# Patient Record
Sex: MALE | Race: WHITE | NOT HISPANIC OR LATINO | Employment: FULL TIME | RURAL
[De-identification: names, ages, dates, MRNs, and addresses within clinical notes are randomized per-mention and may not be internally consistent; named-entity substitution may affect disease eponyms.]

---

## 2018-10-02 ENCOUNTER — HISTORICAL (OUTPATIENT)
Dept: ADMINISTRATIVE | Facility: HOSPITAL | Age: 53
End: 2018-10-02

## 2018-10-04 LAB
LAB AP CLINICAL INFORMATION: NORMAL
LAB AP DIAGNOSIS - HISTORICAL: NORMAL
LAB AP GROSS PATHOLOGY - HISTORICAL: NORMAL
LAB AP SPECIMEN SUBMITTED - HISTORICAL: NORMAL

## 2021-03-26 DIAGNOSIS — N52.9 ERECTILE DYSFUNCTION, UNSPECIFIED ERECTILE DYSFUNCTION TYPE: Primary | ICD-10-CM

## 2021-03-26 RX ORDER — SILDENAFIL CITRATE 20 MG/1
TABLET ORAL
Qty: 30 TABLET | Refills: 11 | Status: SHIPPED | OUTPATIENT
Start: 2021-03-26 | End: 2022-11-07 | Stop reason: SDUPTHER

## 2021-04-01 ENCOUNTER — RESEARCH ENCOUNTER (OUTPATIENT)
Dept: FAMILY MEDICINE | Facility: CLINIC | Age: 56
End: 2021-04-01
Payer: OTHER GOVERNMENT

## 2021-04-01 ENCOUNTER — DOCUMENTATION ONLY (OUTPATIENT)
Dept: FAMILY MEDICINE | Facility: CLINIC | Age: 56
End: 2021-04-01

## 2021-04-01 DIAGNOSIS — Z03.818 ENCNTR FOR OBS FOR SUSP EXPSR TO OTH BIOLG AGENTS RULED OUT: ICD-10-CM

## 2021-04-01 PROCEDURE — 99213 OFFICE O/P EST LOW 20 MIN: CPT | Mod: GC,,, | Performed by: FAMILY MEDICINE

## 2021-04-01 PROCEDURE — 99213 PR OFFICE/OUTPT VISIT, EST, LEVL III, 20-29 MIN: ICD-10-PCS | Mod: GC,,, | Performed by: FAMILY MEDICINE

## 2021-04-08 ENCOUNTER — PATIENT MESSAGE (OUTPATIENT)
Dept: FAMILY MEDICINE | Facility: CLINIC | Age: 56
End: 2021-04-08

## 2021-04-08 ENCOUNTER — TELEPHONE (OUTPATIENT)
Dept: FAMILY MEDICINE | Facility: CLINIC | Age: 56
End: 2021-04-08

## 2021-05-25 ENCOUNTER — OFFICE VISIT (OUTPATIENT)
Dept: FAMILY MEDICINE | Facility: CLINIC | Age: 56
End: 2021-05-25
Payer: OTHER GOVERNMENT

## 2021-05-25 VITALS
WEIGHT: 175 LBS | DIASTOLIC BLOOD PRESSURE: 96 MMHG | SYSTOLIC BLOOD PRESSURE: 143 MMHG | RESPIRATION RATE: 18 BRPM | OXYGEN SATURATION: 95 % | HEART RATE: 54 BPM | BODY MASS INDEX: 28.12 KG/M2 | HEIGHT: 66 IN | TEMPERATURE: 99 F

## 2021-05-25 DIAGNOSIS — L23.7 POISON IVY: Primary | ICD-10-CM

## 2021-05-25 DIAGNOSIS — R03.0 ELEVATED BP WITHOUT DIAGNOSIS OF HYPERTENSION: ICD-10-CM

## 2021-05-25 DIAGNOSIS — N52.9 ERECTILE DYSFUNCTION, UNSPECIFIED ERECTILE DYSFUNCTION TYPE: ICD-10-CM

## 2021-05-25 DIAGNOSIS — R53.83 FATIGUE, UNSPECIFIED TYPE: ICD-10-CM

## 2021-05-25 LAB
ANION GAP SERPL CALCULATED.3IONS-SCNC: 7 MMOL/L (ref 7–16)
BUN SERPL-MCNC: 9 MG/DL (ref 7–18)
BUN/CREAT SERPL: 9 (ref 6–20)
CALCIUM SERPL-MCNC: 8.6 MG/DL (ref 8.5–10.1)
CHLORIDE SERPL-SCNC: 107 MMOL/L (ref 98–107)
CHOLEST SERPL-MCNC: 202 MG/DL (ref 0–200)
CHOLEST/HDLC SERPL: 5.2 {RATIO}
CO2 SERPL-SCNC: 32 MMOL/L (ref 21–32)
CREAT SERPL-MCNC: 1.03 MG/DL (ref 0.7–1.3)
GLUCOSE SERPL-MCNC: 102 MG/DL (ref 74–106)
HDLC SERPL-MCNC: 39 MG/DL (ref 40–60)
LDLC SERPL CALC-MCNC: 119 MG/DL
LDLC/HDLC SERPL: 3.1 {RATIO}
NONHDLC SERPL-MCNC: 163 MG/DL
POTASSIUM SERPL-SCNC: 4.1 MMOL/L (ref 3.5–5.1)
SODIUM SERPL-SCNC: 142 MMOL/L (ref 136–145)
TRIGL SERPL-MCNC: 219 MG/DL (ref 35–150)
VLDLC SERPL-MCNC: 44 MG/DL

## 2021-05-25 PROCEDURE — 84403 ASSAY OF TOTAL TESTOSTERONE: CPT | Mod: 90,,, | Performed by: CLINICAL MEDICAL LABORATORY

## 2021-05-25 PROCEDURE — 84402 TESTOSTERONE, FREE AND TOTAL: ICD-10-PCS | Mod: 90,,, | Performed by: CLINICAL MEDICAL LABORATORY

## 2021-05-25 PROCEDURE — 96372 THER/PROPH/DIAG INJ SC/IM: CPT | Mod: ,,, | Performed by: FAMILY MEDICINE

## 2021-05-25 PROCEDURE — 84403 TESTOSTERONE, FREE AND TOTAL: ICD-10-PCS | Mod: 90,,, | Performed by: CLINICAL MEDICAL LABORATORY

## 2021-05-25 PROCEDURE — 80048 BASIC METABOLIC PANEL: ICD-10-PCS | Mod: ,,, | Performed by: CLINICAL MEDICAL LABORATORY

## 2021-05-25 PROCEDURE — 96372 PR INJECTION,THERAP/PROPH/DIAG2ST, IM OR SUBCUT: ICD-10-PCS | Mod: ,,, | Performed by: FAMILY MEDICINE

## 2021-05-25 PROCEDURE — 80048 BASIC METABOLIC PNL TOTAL CA: CPT | Mod: ,,, | Performed by: CLINICAL MEDICAL LABORATORY

## 2021-05-25 PROCEDURE — 80061 LIPID PANEL: CPT | Mod: ,,, | Performed by: CLINICAL MEDICAL LABORATORY

## 2021-05-25 PROCEDURE — 80061 LIPID PANEL: ICD-10-PCS | Mod: ,,, | Performed by: CLINICAL MEDICAL LABORATORY

## 2021-05-25 PROCEDURE — 84402 ASSAY OF FREE TESTOSTERONE: CPT | Mod: 90,,, | Performed by: CLINICAL MEDICAL LABORATORY

## 2021-05-25 PROCEDURE — 99214 OFFICE O/P EST MOD 30 MIN: CPT | Mod: 25,,, | Performed by: FAMILY MEDICINE

## 2021-05-25 PROCEDURE — 99214 PR OFFICE/OUTPT VISIT, EST, LEVL IV, 30-39 MIN: ICD-10-PCS | Mod: 25,,, | Performed by: FAMILY MEDICINE

## 2021-05-25 RX ORDER — PREDNISONE 20 MG/1
40 TABLET ORAL DAILY
Qty: 10 TABLET | Refills: 0 | Status: SHIPPED | OUTPATIENT
Start: 2021-05-25 | End: 2021-05-30

## 2021-05-25 RX ORDER — DEXAMETHASONE SODIUM PHOSPHATE 4 MG/ML
6 INJECTION, SOLUTION INTRA-ARTICULAR; INTRALESIONAL; INTRAMUSCULAR; INTRAVENOUS; SOFT TISSUE
Status: COMPLETED | OUTPATIENT
Start: 2021-05-25 | End: 2021-05-25

## 2021-05-25 RX ORDER — SILDENAFIL 50 MG/1
TABLET, FILM COATED ORAL
Qty: 30 TABLET | Refills: 1 | Status: SHIPPED | OUTPATIENT
Start: 2021-05-25 | End: 2022-11-22

## 2021-05-25 RX ADMIN — DEXAMETHASONE SODIUM PHOSPHATE 6 MG: 4 INJECTION, SOLUTION INTRA-ARTICULAR; INTRALESIONAL; INTRAMUSCULAR; INTRAVENOUS; SOFT TISSUE at 12:05

## 2021-05-27 LAB
TESTOST FREE SERPL-MCNC: 9.84 NG/DL (ref 4.06–15.6)
TESTOST SERPL-MCNC: 328 NG/DL (ref 240–950)

## 2021-06-21 ENCOUNTER — TELEPHONE (OUTPATIENT)
Dept: FAMILY MEDICINE | Facility: CLINIC | Age: 56
End: 2021-06-21

## 2021-06-22 ENCOUNTER — TELEPHONE (OUTPATIENT)
Dept: FAMILY MEDICINE | Facility: CLINIC | Age: 56
End: 2021-06-22

## 2021-09-10 ENCOUNTER — HOSPITAL ENCOUNTER (EMERGENCY)
Facility: HOSPITAL | Age: 56
Discharge: HOME | End: 2021-09-10
Payer: OTHER GOVERNMENT

## 2021-09-10 VITALS
BODY MASS INDEX: 28.12 KG/M2 | TEMPERATURE: 98 F | HEIGHT: 66 IN | RESPIRATION RATE: 22 BRPM | HEART RATE: 60 BPM | WEIGHT: 175 LBS | DIASTOLIC BLOOD PRESSURE: 85 MMHG | SYSTOLIC BLOOD PRESSURE: 137 MMHG | OXYGEN SATURATION: 97 %

## 2021-09-10 DIAGNOSIS — Z03.818 ENCNTR FOR OBS FOR SUSP EXPSR TO OTH BIOLG AGENTS RULED OUT: ICD-10-CM

## 2021-09-10 DIAGNOSIS — N20.0 RIGHT NEPHROLITHIASIS: Primary | ICD-10-CM

## 2021-09-10 DIAGNOSIS — E87.6 HYPOKALEMIA: ICD-10-CM

## 2021-09-10 LAB
ANION GAP SERPL CALCULATED.3IONS-SCNC: 13 MMOL/L (ref 7–16)
BACTERIA #/AREA URNS HPF: ABNORMAL /HPF
BILIRUB UR QL STRIP: NEGATIVE
BUN SERPL-MCNC: 10 MG/DL (ref 7–18)
BUN/CREAT SERPL: 8 (ref 6–20)
CALCIUM SERPL-MCNC: 8.6 MG/DL (ref 8.5–10.1)
CHLORIDE SERPL-SCNC: 105 MMOL/L (ref 98–107)
CLARITY UR: CLEAR
CO2 SERPL-SCNC: 25 MMOL/L (ref 21–32)
COLOR UR: ABNORMAL
CREAT SERPL-MCNC: 1.33 MG/DL (ref 0.7–1.3)
GLUCOSE SERPL-MCNC: 139 MG/DL (ref 74–106)
GLUCOSE UR STRIP-MCNC: NEGATIVE MG/DL
KETONES UR STRIP-SCNC: NEGATIVE MG/DL
LEUKOCYTE ESTERASE UR QL STRIP: NEGATIVE
MUCOUS THREADS #/AREA URNS HPF: ABNORMAL /HPF
NITRITE UR QL STRIP: NEGATIVE
PH UR STRIP: 6 PH UNITS
POTASSIUM SERPL-SCNC: 3.3 MMOL/L (ref 3.5–5.1)
PROT UR QL STRIP: NEGATIVE
RBC # UR STRIP: ABNORMAL /UL
RBC #/AREA URNS HPF: ABNORMAL /HPF
SODIUM SERPL-SCNC: 140 MMOL/L (ref 136–145)
SP GR UR STRIP: >=1.03
SQUAMOUS #/AREA URNS LPF: ABNORMAL /LPF
UROBILINOGEN UR STRIP-ACNC: 0.2 MG/DL
WBC #/AREA URNS HPF: ABNORMAL /HPF
YEAST #/AREA URNS HPF: ABNORMAL /HPF

## 2021-09-10 PROCEDURE — 81001 URINALYSIS AUTO W/SCOPE: CPT | Performed by: NURSE PRACTITIONER

## 2021-09-10 PROCEDURE — 96361 HYDRATE IV INFUSION ADD-ON: CPT

## 2021-09-10 PROCEDURE — 80048 BASIC METABOLIC PNL TOTAL CA: CPT | Performed by: NURSE PRACTITIONER

## 2021-09-10 PROCEDURE — 99284 EMERGENCY DEPT VISIT MOD MDM: CPT | Mod: 25

## 2021-09-10 PROCEDURE — 36415 COLL VENOUS BLD VENIPUNCTURE: CPT | Performed by: NURSE PRACTITIONER

## 2021-09-10 PROCEDURE — 96374 THER/PROPH/DIAG INJ IV PUSH: CPT

## 2021-09-10 PROCEDURE — 63600175 PHARM REV CODE 636 W HCPCS: Performed by: NURSE PRACTITIONER

## 2021-09-10 PROCEDURE — 81003 URINALYSIS AUTO W/O SCOPE: CPT | Performed by: NURSE PRACTITIONER

## 2021-09-10 PROCEDURE — 99284 PR EMERGENCY DEPT VISIT,LEVEL IV: ICD-10-PCS | Mod: ,,, | Performed by: NURSE PRACTITIONER

## 2021-09-10 PROCEDURE — 99284 EMERGENCY DEPT VISIT MOD MDM: CPT | Mod: ,,, | Performed by: NURSE PRACTITIONER

## 2021-09-10 PROCEDURE — 96375 TX/PRO/DX INJ NEW DRUG ADDON: CPT

## 2021-09-10 PROCEDURE — 25000003 PHARM REV CODE 250: Performed by: NURSE PRACTITIONER

## 2021-09-10 RX ORDER — KETOROLAC TROMETHAMINE 30 MG/ML
30 INJECTION, SOLUTION INTRAMUSCULAR; INTRAVENOUS
Status: COMPLETED | OUTPATIENT
Start: 2021-09-10 | End: 2021-09-10

## 2021-09-10 RX ORDER — SODIUM CHLORIDE 9 MG/ML
INJECTION, SOLUTION INTRAVENOUS
Status: COMPLETED | OUTPATIENT
Start: 2021-09-10 | End: 2021-09-10

## 2021-09-10 RX ORDER — KETOROLAC TROMETHAMINE 10 MG/1
10 TABLET, FILM COATED ORAL EVERY 6 HOURS PRN
Qty: 12 TABLET | Refills: 0 | Status: SHIPPED | OUTPATIENT
Start: 2021-09-10 | End: 2021-09-13

## 2021-09-10 RX ORDER — ONDANSETRON 2 MG/ML
4 INJECTION INTRAMUSCULAR; INTRAVENOUS
Status: COMPLETED | OUTPATIENT
Start: 2021-09-10 | End: 2021-09-10

## 2021-09-10 RX ORDER — ONDANSETRON 4 MG/1
4 TABLET, FILM COATED ORAL EVERY 6 HOURS PRN
Qty: 12 TABLET | Refills: 0 | Status: SHIPPED | OUTPATIENT
Start: 2021-09-10 | End: 2022-11-22

## 2021-09-10 RX ORDER — POTASSIUM CHLORIDE 20 MEQ/1
40 TABLET, EXTENDED RELEASE ORAL
Status: COMPLETED | OUTPATIENT
Start: 2021-09-10 | End: 2021-09-10

## 2021-09-10 RX ADMIN — KETOROLAC TROMETHAMINE 30 MG: 30 INJECTION, SOLUTION INTRAMUSCULAR; INTRAVENOUS at 12:09

## 2021-09-10 RX ADMIN — SODIUM CHLORIDE: 9 INJECTION, SOLUTION INTRAVENOUS at 12:09

## 2021-09-10 RX ADMIN — ONDANSETRON HYDROCHLORIDE 4 MG: 2 SOLUTION INTRAMUSCULAR; INTRAVENOUS at 12:09

## 2021-09-10 RX ADMIN — POTASSIUM CHLORIDE 40 MEQ: 1500 TABLET, EXTENDED RELEASE ORAL at 12:09

## 2021-10-07 ENCOUNTER — OFFICE VISIT (OUTPATIENT)
Dept: UROLOGY | Facility: CLINIC | Age: 56
End: 2021-10-07
Payer: OTHER GOVERNMENT

## 2021-10-07 VITALS
SYSTOLIC BLOOD PRESSURE: 136 MMHG | HEART RATE: 64 BPM | BODY MASS INDEX: 28.12 KG/M2 | HEIGHT: 66 IN | WEIGHT: 175 LBS | DIASTOLIC BLOOD PRESSURE: 87 MMHG

## 2021-10-07 DIAGNOSIS — N20.0 KIDNEY STONE: Primary | ICD-10-CM

## 2021-10-07 DIAGNOSIS — Z12.5 SCREENING PSA (PROSTATE SPECIFIC ANTIGEN): ICD-10-CM

## 2021-10-07 DIAGNOSIS — N52.9 ERECTILE DYSFUNCTION, UNSPECIFIED ERECTILE DYSFUNCTION TYPE: ICD-10-CM

## 2021-10-07 PROCEDURE — 99202 PR OFFICE/OUTPT VISIT, NEW, LEVL II, 15-29 MIN: ICD-10-PCS | Mod: S$PBB,,, | Performed by: UROLOGY

## 2021-10-07 PROCEDURE — 99202 OFFICE O/P NEW SF 15 MIN: CPT | Mod: S$PBB,,, | Performed by: UROLOGY

## 2021-10-07 PROCEDURE — 99213 OFFICE O/P EST LOW 20 MIN: CPT | Mod: PBBFAC | Performed by: UROLOGY

## 2021-10-07 RX ORDER — KETOROLAC TROMETHAMINE 10 MG/1
10 TABLET, FILM COATED ORAL EVERY 6 HOURS PRN
Qty: 30 TABLET | Refills: 1 | Status: SHIPPED | OUTPATIENT
Start: 2021-10-07 | End: 2021-10-12

## 2021-10-31 ENCOUNTER — HOSPITAL ENCOUNTER (EMERGENCY)
Facility: HOSPITAL | Age: 56
Discharge: HOME | End: 2021-10-31
Payer: OTHER GOVERNMENT

## 2021-10-31 VITALS
RESPIRATION RATE: 16 BRPM | BODY MASS INDEX: 28.93 KG/M2 | WEIGHT: 180 LBS | HEART RATE: 93 BPM | TEMPERATURE: 99 F | HEIGHT: 66 IN | DIASTOLIC BLOOD PRESSURE: 94 MMHG | OXYGEN SATURATION: 98 % | SYSTOLIC BLOOD PRESSURE: 162 MMHG

## 2021-10-31 DIAGNOSIS — Z03.818 ENCNTR FOR OBS FOR SUSP EXPSR TO OTH BIOLG AGENTS RULED OUT: ICD-10-CM

## 2021-10-31 DIAGNOSIS — J02.9 PHARYNGITIS, UNSPECIFIED ETIOLOGY: Primary | ICD-10-CM

## 2021-10-31 LAB — RAPID GROUP A STREP: NEGATIVE

## 2021-10-31 PROCEDURE — 99283 EMERGENCY DEPT VISIT LOW MDM: CPT | Mod: ,,, | Performed by: NURSE PRACTITIONER

## 2021-10-31 PROCEDURE — 99283 EMERGENCY DEPT VISIT LOW MDM: CPT

## 2021-10-31 PROCEDURE — 99283 PR EMERGENCY DEPT VISIT,LEVEL III: ICD-10-PCS | Mod: ,,, | Performed by: NURSE PRACTITIONER

## 2021-10-31 PROCEDURE — 87880 STREP A ASSAY W/OPTIC: CPT | Performed by: NURSE PRACTITIONER

## 2021-10-31 RX ORDER — AMOXICILLIN AND CLAVULANATE POTASSIUM 875; 125 MG/1; MG/1
1 TABLET, FILM COATED ORAL 2 TIMES DAILY
Qty: 14 TABLET | Refills: 0 | Status: SHIPPED | OUTPATIENT
Start: 2021-10-31 | End: 2022-11-22

## 2021-10-31 RX ORDER — KETOROLAC TROMETHAMINE 10 MG/1
10 TABLET, FILM COATED ORAL
COMMUNITY
Start: 2021-09-10 | End: 2022-11-07

## 2021-11-04 ENCOUNTER — OFFICE VISIT (OUTPATIENT)
Dept: FAMILY MEDICINE | Facility: CLINIC | Age: 56
End: 2021-11-04
Payer: OTHER GOVERNMENT

## 2021-11-04 VITALS
DIASTOLIC BLOOD PRESSURE: 98 MMHG | WEIGHT: 166 LBS | BODY MASS INDEX: 26.68 KG/M2 | HEART RATE: 99 BPM | SYSTOLIC BLOOD PRESSURE: 136 MMHG | TEMPERATURE: 99 F | HEIGHT: 66 IN | OXYGEN SATURATION: 99 %

## 2021-11-04 DIAGNOSIS — R07.0 THROAT PAIN: ICD-10-CM

## 2021-11-04 DIAGNOSIS — K13.70 UNSPECIFIED LESIONS OF ORAL MUCOSA: Primary | ICD-10-CM

## 2021-11-04 PROCEDURE — 87070 CULTURE OTHR SPECIMN AEROBIC: CPT | Mod: ,,, | Performed by: CLINICAL MEDICAL LABORATORY

## 2021-11-04 PROCEDURE — 99203 OFFICE O/P NEW LOW 30 MIN: CPT | Mod: ,,, | Performed by: NURSE PRACTITIONER

## 2021-11-04 PROCEDURE — 87529 MAYO GENERIC ORDERABLE: ICD-10-PCS | Mod: 90,,, | Performed by: CLINICAL MEDICAL LABORATORY

## 2021-11-04 PROCEDURE — 99203 PR OFFICE/OUTPT VISIT, NEW, LEVL III, 30-44 MIN: ICD-10-PCS | Mod: ,,, | Performed by: NURSE PRACTITIONER

## 2021-11-04 PROCEDURE — 87070 CULTURE, UPPER RESPIRATORY: ICD-10-PCS | Mod: ,,, | Performed by: CLINICAL MEDICAL LABORATORY

## 2021-11-04 PROCEDURE — 36415 COLL VENOUS BLD VENIPUNCTURE: CPT | Mod: ,,, | Performed by: CLINICAL MEDICAL LABORATORY

## 2021-11-04 PROCEDURE — 36415 PR COLLECTION VENOUS BLOOD,VENIPUNCTURE: ICD-10-PCS | Mod: ,,, | Performed by: CLINICAL MEDICAL LABORATORY

## 2021-11-04 PROCEDURE — 87529 HSV DNA AMP PROBE: CPT | Mod: 90,,, | Performed by: CLINICAL MEDICAL LABORATORY

## 2021-11-04 RX ORDER — NYSTATIN 100000 [USP'U]/ML
4 SUSPENSION ORAL 4 TIMES DAILY
Qty: 160 ML | Refills: 0 | Status: SHIPPED | OUTPATIENT
Start: 2021-11-04 | End: 2021-11-14

## 2021-11-04 RX ORDER — VALACYCLOVIR HYDROCHLORIDE 500 MG/1
500 TABLET, FILM COATED ORAL 2 TIMES DAILY
Qty: 28 TABLET | Refills: 0 | Status: SHIPPED | OUTPATIENT
Start: 2021-11-04 | End: 2021-11-18 | Stop reason: SDUPTHER

## 2021-11-06 LAB — CULTURE, UPPER RESPIRATORY: NORMAL

## 2021-11-12 LAB — MAYO GENERIC ORDERABLE RESULT: ABNORMAL

## 2021-11-18 DIAGNOSIS — K13.70 UNSPECIFIED LESIONS OF ORAL MUCOSA: ICD-10-CM

## 2021-11-18 RX ORDER — VALACYCLOVIR HYDROCHLORIDE 500 MG/1
500 TABLET, FILM COATED ORAL 2 TIMES DAILY
Qty: 28 TABLET | Refills: 0 | Status: SHIPPED | OUTPATIENT
Start: 2021-11-18 | End: 2023-07-05

## 2022-05-20 ENCOUNTER — OFFICE VISIT (OUTPATIENT)
Dept: FAMILY MEDICINE | Facility: CLINIC | Age: 57
End: 2022-05-20
Payer: COMMERCIAL

## 2022-05-20 VITALS
TEMPERATURE: 98 F | DIASTOLIC BLOOD PRESSURE: 98 MMHG | BODY MASS INDEX: 27.77 KG/M2 | WEIGHT: 172.81 LBS | RESPIRATION RATE: 16 BRPM | SYSTOLIC BLOOD PRESSURE: 132 MMHG | HEART RATE: 63 BPM | OXYGEN SATURATION: 96 % | HEIGHT: 66 IN

## 2022-05-20 DIAGNOSIS — L23.7 CONTACT DERMATITIS DUE TO POISON IVY: Primary | ICD-10-CM

## 2022-05-20 PROCEDURE — 99213 PR OFFICE/OUTPT VISIT, EST, LEVL III, 20-29 MIN: ICD-10-PCS | Mod: 25,,, | Performed by: NURSE PRACTITIONER

## 2022-05-20 PROCEDURE — 99213 OFFICE O/P EST LOW 20 MIN: CPT | Mod: 25,,, | Performed by: NURSE PRACTITIONER

## 2022-05-20 PROCEDURE — 96372 THER/PROPH/DIAG INJ SC/IM: CPT | Mod: ,,, | Performed by: NURSE PRACTITIONER

## 2022-05-20 PROCEDURE — 96372 PR INJECTION,THERAP/PROPH/DIAG2ST, IM OR SUBCUT: ICD-10-PCS | Mod: ,,, | Performed by: NURSE PRACTITIONER

## 2022-05-20 RX ORDER — TRIAMCINOLONE ACETONIDE 1 MG/G
CREAM TOPICAL 2 TIMES DAILY PRN
Qty: 454 G | Refills: 0 | Status: SHIPPED | OUTPATIENT
Start: 2022-05-20 | End: 2023-07-05

## 2022-05-20 RX ORDER — DEXAMETHASONE SODIUM PHOSPHATE 4 MG/ML
4 INJECTION, SOLUTION INTRA-ARTICULAR; INTRALESIONAL; INTRAMUSCULAR; INTRAVENOUS; SOFT TISSUE
Status: COMPLETED | OUTPATIENT
Start: 2022-05-20 | End: 2022-05-20

## 2022-05-20 RX ORDER — PREDNISONE 20 MG/1
TABLET ORAL
Qty: 10 TABLET | Refills: 0 | Status: SHIPPED | OUTPATIENT
Start: 2022-05-20 | End: 2022-09-20

## 2022-05-20 RX ADMIN — DEXAMETHASONE SODIUM PHOSPHATE 4 MG: 4 INJECTION, SOLUTION INTRA-ARTICULAR; INTRALESIONAL; INTRAMUSCULAR; INTRAVENOUS; SOFT TISSUE at 02:05

## 2022-05-20 NOTE — PROGRESS NOTES
"Subjective:       Patient ID: Eugenio Judge is a 56 y.o. male.    Chief Complaint: Poison Ivy (Patient present to clinic with contact to poison ivy. Rash on bilateral arms and legs starting Saturday. )    HPI  Review of Systems   Respiratory: Negative.    Cardiovascular: Negative.    Skin: Positive for itching and rash.          Reviewed family, medical, surgical, and social history.    Objective:      BP (!) 132/98 (BP Location: Right arm, Patient Position: Sitting, BP Method: Large (Automatic))   Pulse 63   Temp 98.4 °F (36.9 °C)   Resp 16   Ht 5' 6" (1.676 m)   Wt 78.4 kg (172 lb 12.8 oz)   SpO2 96%   BMI 27.89 kg/m²   Physical Exam  Vitals and nursing note reviewed.   Constitutional:       General: He is not in acute distress.     Appearance: Normal appearance. He is not ill-appearing, toxic-appearing or diaphoretic.   Cardiovascular:      Rate and Rhythm: Normal rate and regular rhythm.      Heart sounds: Normal heart sounds.   Pulmonary:      Effort: Pulmonary effort is normal.      Breath sounds: Normal breath sounds.   Musculoskeletal:      Cervical back: Normal range of motion and neck supple.   Skin:     General: Skin is warm and dry.      Findings: Rash present.      Comments: Vesicular lesions in linear distribution on arms, legs, trunk. No scabbing or exudate.    Neurological:      Mental Status: He is alert and oriented to person, place, and time.   Psychiatric:         Mood and Affect: Mood normal.         Behavior: Behavior normal.         Thought Content: Thought content normal.         Judgment: Judgment normal.            No visits with results within 1 Day(s) from this visit.   Latest known visit with results is:   Office Visit on 11/04/2021   Component Date Value Ref Range Status    Culture, Upper Respiratory 11/04/2021 Typical respiratory rae   Final    [place] Generic Orderable 11/04/2021 SEE COMMENTS (A)  Final       Test                          Result              Flag  Unit  " RefValue  ----------------------------------------------------------------------  Herpes Simplex Virus Type 1, 2 DNA    Source                      mouth                                       HSV 1 DNA                   DETECTED             AB                     HSV 2 DNA                   NOT DETECTED                              REFERENCE RANGE: NOT DETECTED     This test was developed and its analytical  performance characteristics have been determined  by [facility]. It has not been cleared or  approved by FDA. This assay has been validated  pursuant to the CLIA regulations and is used for  clinical purposes.     Test Performed by:  [facility]  [id number] [place] [address]      Assessment:       1. Contact dermatitis due to poison ivy        Plan:       Contact dermatitis due to poison ivy  -     predniSONE (DELTASONE) 20 MG tablet; Take 2 po daily for 5 days. Then, 1 po daily for 5 days. Start tomorrow.  Dispense: 10 tablet; Refill: 0  -     triamcinolone acetonide 0.1% (KENALOG) 0.1 % cream; Apply topically 2 (two) times daily as needed (itching. Do not use on face.).  Dispense: 454 g; Refill: 0  -     dexamethasone injection 4 mg    RTC PRN          Risks, benefits, and side effects were discussed with the patient. All questions were answered to the fullest satisfaction of the patient, and pt verbalized understanding and agreement to treatment plan. Pt was to call with any new or worsening symptoms, or present to the ER.

## 2022-09-20 ENCOUNTER — OFFICE VISIT (OUTPATIENT)
Dept: FAMILY MEDICINE | Facility: CLINIC | Age: 57
End: 2022-09-20
Payer: COMMERCIAL

## 2022-09-20 VITALS
SYSTOLIC BLOOD PRESSURE: 152 MMHG | WEIGHT: 179 LBS | TEMPERATURE: 98 F | HEART RATE: 57 BPM | BODY MASS INDEX: 28.77 KG/M2 | OXYGEN SATURATION: 98 % | RESPIRATION RATE: 18 BRPM | HEIGHT: 66 IN | DIASTOLIC BLOOD PRESSURE: 94 MMHG

## 2022-09-20 DIAGNOSIS — L23.7 CONTACT DERMATITIS DUE TO POISON IVY: Primary | ICD-10-CM

## 2022-09-20 PROCEDURE — 99213 PR OFFICE/OUTPT VISIT, EST, LEVL III, 20-29 MIN: ICD-10-PCS | Mod: 25,,, | Performed by: FAMILY MEDICINE

## 2022-09-20 PROCEDURE — 99213 OFFICE O/P EST LOW 20 MIN: CPT | Mod: 25,,, | Performed by: FAMILY MEDICINE

## 2022-09-20 PROCEDURE — 96372 THER/PROPH/DIAG INJ SC/IM: CPT | Mod: ,,, | Performed by: FAMILY MEDICINE

## 2022-09-20 PROCEDURE — 96372 PR INJECTION,THERAP/PROPH/DIAG2ST, IM OR SUBCUT: ICD-10-PCS | Mod: ,,, | Performed by: FAMILY MEDICINE

## 2022-09-20 RX ORDER — DEXAMETHASONE SODIUM PHOSPHATE 4 MG/ML
6 INJECTION, SOLUTION INTRA-ARTICULAR; INTRALESIONAL; INTRAMUSCULAR; INTRAVENOUS; SOFT TISSUE
Status: COMPLETED | OUTPATIENT
Start: 2022-09-20 | End: 2022-09-20

## 2022-09-20 RX ORDER — PREDNISONE 20 MG/1
TABLET ORAL
Qty: 15 TABLET | Refills: 0 | Status: SHIPPED | OUTPATIENT
Start: 2022-09-20 | End: 2022-11-22

## 2022-09-20 RX ADMIN — DEXAMETHASONE SODIUM PHOSPHATE 6 MG: 4 INJECTION, SOLUTION INTRA-ARTICULAR; INTRALESIONAL; INTRAMUSCULAR; INTRAVENOUS; SOFT TISSUE at 01:09

## 2022-09-20 NOTE — PROGRESS NOTES
Subjective:       Patient ID: Eugenio Judge is a 56 y.o. male.    Chief Complaint: Poison Ivy    Rapidly spreading lesion since yesterday.    Poison Ivy    Review of Systems      Objective:      Physical Exam  Constitutional:       General: He is not in acute distress.     Appearance: Normal appearance. He is not ill-appearing.   Cardiovascular:      Rate and Rhythm: Normal rate and regular rhythm.   Skin:     Findings: Lesion (scattered lesions on both arms and legs.  Most severe on his legs.  Several vesicular lesion) present.   Neurological:      Mental Status: He is alert.       Assessment:       Problem List Items Addressed This Visit    None  Visit Diagnoses       Contact dermatitis due to poison ivy    -  Primary            Plan:       Decadron.  Prednisone

## 2022-11-02 ENCOUNTER — HOSPITAL ENCOUNTER (OUTPATIENT)
Dept: RADIOLOGY | Facility: HOSPITAL | Age: 57
Discharge: HOME | End: 2022-11-02
Attending: UROLOGY
Payer: COMMERCIAL

## 2022-11-02 DIAGNOSIS — N20.0 KIDNEY STONE: ICD-10-CM

## 2022-11-02 PROCEDURE — 74018 XR KUB: ICD-10-PCS | Mod: 26,,, | Performed by: RADIOLOGY

## 2022-11-02 PROCEDURE — 74018 RADEX ABDOMEN 1 VIEW: CPT | Mod: TC

## 2022-11-02 PROCEDURE — 74018 RADEX ABDOMEN 1 VIEW: CPT | Mod: 26,,, | Performed by: RADIOLOGY

## 2022-11-07 ENCOUNTER — OFFICE VISIT (OUTPATIENT)
Dept: UROLOGY | Facility: CLINIC | Age: 57
End: 2022-11-07
Payer: COMMERCIAL

## 2022-11-07 VITALS
DIASTOLIC BLOOD PRESSURE: 95 MMHG | HEART RATE: 69 BPM | SYSTOLIC BLOOD PRESSURE: 144 MMHG | HEIGHT: 66 IN | WEIGHT: 186 LBS | BODY MASS INDEX: 29.89 KG/M2

## 2022-11-07 DIAGNOSIS — Z12.5 SCREENING PSA (PROSTATE SPECIFIC ANTIGEN): ICD-10-CM

## 2022-11-07 DIAGNOSIS — N20.0 KIDNEY STONES: Primary | ICD-10-CM

## 2022-11-07 DIAGNOSIS — N52.9 ERECTILE DYSFUNCTION, UNSPECIFIED ERECTILE DYSFUNCTION TYPE: ICD-10-CM

## 2022-11-07 PROCEDURE — 99213 PR OFFICE/OUTPT VISIT, EST, LEVL III, 20-29 MIN: ICD-10-PCS | Mod: S$PBB,,, | Performed by: UROLOGY

## 2022-11-07 PROCEDURE — 99214 OFFICE O/P EST MOD 30 MIN: CPT | Mod: PBBFAC | Performed by: UROLOGY

## 2022-11-07 PROCEDURE — 99213 OFFICE O/P EST LOW 20 MIN: CPT | Mod: S$PBB,,, | Performed by: UROLOGY

## 2022-11-07 RX ORDER — SILDENAFIL CITRATE 20 MG/1
TABLET ORAL
Qty: 30 TABLET | Refills: 11 | Status: SHIPPED | OUTPATIENT
Start: 2022-11-07 | End: 2023-07-05

## 2022-11-07 RX ORDER — KETOROLAC TROMETHAMINE 10 MG/1
10 TABLET, FILM COATED ORAL EVERY 4 HOURS PRN
Qty: 30 TABLET | Refills: 0 | Status: SHIPPED | OUTPATIENT
Start: 2022-11-07 | End: 2022-11-12

## 2022-11-07 NOTE — PROGRESS NOTES
Subjective:       Patient ID: Eugenio Judge is a 57 y.o. male.    Chief Complaint: Follow-up (One year visit, KUB)       History of Present Illness  Mr. Judge is 47 years old. He was in his usual state of health until Saturday, September 29 when he had onset of left mid abdominal pain that was rather severe. It is associated with nausea but no vomiting. Pain was so severe he had to go to emergency room. He had a couple episodes of pain in the same distribution of less intensity in recent months but nothing like he had on the day requiring trip to the emergency room. He was found to have a stone in the mid ureter. In now for followup of stone. He has no history of previous stone problems. He has no voiding difficulty and has not noted hematuria. He is not aware of any previous urinary tract infection or any other  problems. No history of prostate problems or other  difficulty. He was given Lortabs in the emergency room. His last pain medication was taken about 6 AM today.  Patient's father is my patient also.     The above-note is the note of October 3, 2012. Mr. Judge has had very minimal pain since he was here that day. He has taken very little Dilaudid that was given but it did work better than he did taking 10 Lortabs daily. Totally asymptomatic today.     Last note above his of November 9, 2012. Mr. Judge has had pain about 2 weeks ago that was typical of ureteral colic but short-lived and took only 2 Dilaudids. Basically back to baseline now.     Last note above is note of January 10, 2013. Patient has had essentially no pain during that time and has not had any activity of  the stone except maybe some slight twinges. He has required no pain medication. He has had the stone in the left ureter since September 29, 2012. CT scan was done September 29. Feeling fine today.  (June 18, 2013)     Mr. Judge returned today for his CT scan and followup on the stone in the left ureter which has been present for  nearly a year. Still clinically asymptomatic with no pain in many months.  (July 22, 2013  -----------------------------------------------------------------------------------------------------------------------------------------------------------------------------------------------------------------------------------------------------------------------------------------------------------------------  Above notes are from the old electronic medical record.     Mr. Judge went several years with no problems from stones but had onset of severe right ureteral colic on September 10th.  Necessitated a trip to the emergency room on that day.  He was given script for Toradol and was found to have a small calcification in line with the distal right ureter.  He also has multiple calculi overlying both kidneys that are relatively small.  He has had no pain since a few days after that trip to the emergency room and has not been aware of stone passing.    He also had questions about possible testicular hypogonadism but he had a serum testosterone of 328 which is normal and his free testosterone was in normal range also.  Needs renewal of sildenafil.  Feeling okay at present except for fatigue. (October 7, 2021)    Mr. Judge is in for yearly checkup.  He passed a stone in late May and another 1 on August 5th with minimal discomfort  and did not require pain medication.  He has had no worsening bladder outlet obstructive symptoms and otherwise has had a reasonably good year except for problems of complication from COVID vaccine last October 31st.  Blood pressure elevated today but apparently does okay most of the time. [November 7, 2022]     Review of Systems      Objective:      Physical Exam  Constitutional:       Appearance: Normal appearance. He is normal weight.   Genitourinary:     Comments: Patient requested we skip rectal exam and PSA this year  Neurological:      Mental Status: He is alert.   Psychiatric:         Mood  and Affect: Mood normal.         Behavior: Behavior normal.     Urinalysis revealed occasional pus cells but did not see any red cells.  Dipstick negative with pH 6.0 and specific gravity 1.020  Assessment:       Problem List Items Addressed This Visit    None  Visit Diagnoses       Kidney stones    -  Primary    Relevant Orders    X-Ray KUB    Erectile dysfunction, unspecified erectile dysfunction type        Relevant Medications    sildenafil (REVATIO) 20 mg Tab    Screening PSA (prostate specific antigen)        Relevant Orders    PSA, Screening            Plan:         KUB reviewed with patient.  There seem to be stones overlying both kidneys but may be fewer than there were last year.  No substantive stones noted.  Skip PSA and rectal exam at patient request.  1 year appointment with KUB and PSA.  We will see sooner as needed for acute colic etc.

## 2022-11-08 NOTE — PATIENT INSTRUCTIONS
KUB reviewed with patient.  There seem to be stones overlying both kidneys but may be fewer than there were last year.  No substantive stones noted.  Skip PSA and rectal exam at patient request.  1 year appointment with KUB and PSA.  We will see sooner as needed for acute colic etc.

## 2022-11-22 ENCOUNTER — OFFICE VISIT (OUTPATIENT)
Dept: PRIMARY CARE CLINIC | Facility: CLINIC | Age: 57
End: 2022-11-22
Payer: COMMERCIAL

## 2022-11-22 VITALS
RESPIRATION RATE: 18 BRPM | DIASTOLIC BLOOD PRESSURE: 80 MMHG | WEIGHT: 180 LBS | HEIGHT: 66 IN | HEART RATE: 70 BPM | BODY MASS INDEX: 28.93 KG/M2 | TEMPERATURE: 98 F | OXYGEN SATURATION: 97 % | SYSTOLIC BLOOD PRESSURE: 138 MMHG

## 2022-11-22 DIAGNOSIS — Z23 NEED FOR DIPHTHERIA-TETANUS-PERTUSSIS (TDAP) VACCINE: Primary | ICD-10-CM

## 2022-11-22 DIAGNOSIS — S61.210A LACERATION OF RIGHT INDEX FINGER WITHOUT FOREIGN BODY WITHOUT DAMAGE TO NAIL, INITIAL ENCOUNTER: ICD-10-CM

## 2022-11-22 PROCEDURE — 90471 IMMUNIZATION ADMIN: CPT | Mod: ,,, | Performed by: NURSE PRACTITIONER

## 2022-11-22 PROCEDURE — 90715 TDAP VACCINE GREATER THAN OR EQUAL TO 7YO IM: ICD-10-PCS | Mod: ,,, | Performed by: NURSE PRACTITIONER

## 2022-11-22 PROCEDURE — 99213 PR OFFICE/OUTPT VISIT, EST, LEVL III, 20-29 MIN: ICD-10-PCS | Mod: 25,,, | Performed by: NURSE PRACTITIONER

## 2022-11-22 PROCEDURE — 90471 TDAP VACCINE GREATER THAN OR EQUAL TO 7YO IM: ICD-10-PCS | Mod: ,,, | Performed by: NURSE PRACTITIONER

## 2022-11-22 PROCEDURE — 90715 TDAP VACCINE 7 YRS/> IM: CPT | Mod: ,,, | Performed by: NURSE PRACTITIONER

## 2022-11-22 PROCEDURE — 99213 OFFICE O/P EST LOW 20 MIN: CPT | Mod: 25,,, | Performed by: NURSE PRACTITIONER

## 2022-11-22 NOTE — PROGRESS NOTES
Subjective:       Patient ID: Eugenio Judge is a 57 y.o. male.    Chief Complaint: Work Related Injury (Patient presents here today with work related injury to right index finger that occurred on 11/22/22. Patient states he bumped his right knuckle up against a power richy. 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     Review of Systems   Integumentary:  Positive for wound.   All other systems reviewed and are negative.      Objective:      Physical Exam  Vitals and nursing note reviewed.   Constitutional:       Appearance: Normal appearance.   HENT:      Head: Normocephalic.   Eyes:      Pupils: Pupils are equal, round, and reactive to light.   Cardiovascular:      Rate and Rhythm: Normal rate and regular rhythm.      Heart sounds: Normal heart sounds.   Pulmonary:      Effort: Pulmonary effort is normal.      Breath sounds: Normal breath sounds.   Musculoskeletal:         General: Tenderness and signs of injury present. No swelling. Normal range of motion.      Cervical back: Normal range of motion.   Skin:     General: Skin is warm and dry.      Comments: Small superficial laceration right index finger MIP joint   Neurological:      General: No focal deficit present.      Mental Status: He is alert and oriented to person, place, and time.   Psychiatric:         Attention and Perception: Attention and perception normal.         Mood and Affect: Mood normal.         Speech: Speech normal.         Behavior: Behavior normal. Behavior is cooperative.         Cognition and Memory: Cognition normal.         Judgment: Judgment normal.       Assessment:       Problem List Items Addressed This Visit    None  Visit Diagnoses       Need for diphtheria-tetanus-pertussis (Tdap) vaccine    -  Primary    Relevant Orders    Tdap Vaccine            Plan:       RTW full duty. Keep wound covered while at work. RTN to wfw prn.

## 2023-01-09 ENCOUNTER — OFFICE VISIT (OUTPATIENT)
Dept: UROLOGY | Facility: CLINIC | Age: 58
End: 2023-01-09
Payer: COMMERCIAL

## 2023-01-09 VITALS
BODY MASS INDEX: 29.09 KG/M2 | SYSTOLIC BLOOD PRESSURE: 147 MMHG | HEART RATE: 73 BPM | HEIGHT: 66 IN | WEIGHT: 181 LBS | DIASTOLIC BLOOD PRESSURE: 98 MMHG

## 2023-01-09 DIAGNOSIS — I10 ESSENTIAL HYPERTENSION: ICD-10-CM

## 2023-01-09 DIAGNOSIS — D29.30 ADENOMATOID TUMOR OF EPIDIDYMIS: ICD-10-CM

## 2023-01-09 DIAGNOSIS — N50.89 SCROTAL MASS: Primary | ICD-10-CM

## 2023-01-09 PROCEDURE — 99213 PR OFFICE/OUTPT VISIT, EST, LEVL III, 20-29 MIN: ICD-10-PCS | Mod: S$PBB,,, | Performed by: UROLOGY

## 2023-01-09 PROCEDURE — 99213 OFFICE O/P EST LOW 20 MIN: CPT | Mod: PBBFAC | Performed by: UROLOGY

## 2023-01-09 PROCEDURE — 99213 OFFICE O/P EST LOW 20 MIN: CPT | Mod: S$PBB,,, | Performed by: UROLOGY

## 2023-01-09 NOTE — PATIENT INSTRUCTIONS
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  Patient will be set up for scrotal ultrasound and follow-up as quickly as we can get him worked in.  He will be notified when to return by telephone.

## 2023-01-09 NOTE — PROGRESS NOTES
Subjective:       Patient ID: Eugenio Judge is a 57 y.o. male.    Chief Complaint: Follow-up (Patient here today for lump in testicle. )       History of Present Illness  Mr. Judge is 47 years old. He was in his usual state of health until Saturday, September 29 when he had onset of left mid abdominal pain that was rather severe. It is associated with nausea but no vomiting. Pain was so severe he had to go to emergency room. He had a couple episodes of pain in the same distribution of less intensity in recent months but nothing like he had on the day requiring trip to the emergency room. He was found to have a stone in the mid ureter. In now for followup of stone. He has no history of previous stone problems. He has no voiding difficulty and has not noted hematuria. He is not aware of any previous urinary tract infection or any other  problems. No history of prostate problems or other  difficulty. He was given Lortabs in the emergency room. His last pain medication was taken about 6 AM today.  Patient's father is my patient also.     The above-note is the note of October 3, 2012. Mr. Judge has had very minimal pain since he was here that day. He has taken very little Dilaudid that was given but it did work better than he did taking 10 Lortabs daily. Totally asymptomatic today.     Last note above his of November 9, 2012. Mr. Judge has had pain about 2 weeks ago that was typical of ureteral colic but short-lived and took only 2 Dilaudids. Basically back to baseline now.     Last note above is note of January 10, 2013. Patient has had essentially no pain during that time and has not had any activity of  the stone except maybe some slight twinges. He has required no pain medication. He has had the stone in the left ureter since September 29, 2012. CT scan was done September 29. Feeling fine today.  (June 18, 2013)     Mr. Judge returned today for his CT scan and followup on the stone in the left ureter which  has been present for nearly a year. Still clinically asymptomatic with no pain in many months.  (July 22, 2013  -----------------------------------------------------------------------------------------------------------------------------------------------------------------------------------------------------------------------------------------------------------------------------------------------------------------------  Above notes are from the old electronic medical record.     Mr. Judge went several years with no problems from stones but had onset of severe right ureteral colic on September 10th.  Necessitated a trip to the emergency room on that day.  He was given script for Toradol and was found to have a small calcification in line with the distal right ureter.  He also has multiple calculi overlying both kidneys that are relatively small.  He has had no pain since a few days after that trip to the emergency room and has not been aware of stone passing.    He also had questions about possible testicular hypogonadism but he had a serum testosterone of 328 which is normal and his free testosterone was in normal range also.  Needs renewal of sildenafil.  Feeling okay at present except for fatigue. (October 7, 2021)     Mr. Judge is in for yearly checkup.  He passed a stone in late May and another 1 on August 5th with minimal discomfort  and did not require pain medication.  He has had no worsening bladder outlet obstructive symptoms and otherwise has had a reasonably good year except for problems of complication from COVID vaccine last October 31st.  Blood pressure elevated today but apparently does okay most of the time. [November 7, 2022]    Mr. Judge is in ahead of planned visit because he has found a mass above his right testis that was found on [place] [address].  It is asymptomatic other than presence.  Feels firm but nontender. [January 9, 2023]       Review of Systems      Objective:      Physical  Exam  Constitutional:       Appearance: Normal appearance. He is normal weight.   Cardiovascular:      Comments: Follow-up blood pressure 160/100  Genitourinary:     Comments: There is a firm slightly irregular mass at the upper pole of the right testis that feels separate from the testis.  I suspect it is located on the globus major of the epididymis.  Skin:     General: Skin is warm.   Neurological:      General: No focal deficit present.      Mental Status: He is alert.   Psychiatric:         Mood and Affect: Mood normal.         Behavior: Behavior normal.     Urinalysis revealed occasional pus cells.  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  Patient will be set up for scrotal ultrasound and follow-up as quickly as we can get him worked in.  He will be notified when to return by telephone.

## 2023-01-11 DIAGNOSIS — N50.89 SCROTAL MASS: ICD-10-CM

## 2023-01-11 DIAGNOSIS — N50.89 MASS OF RIGHT TESTICLE: Primary | ICD-10-CM

## 2023-01-24 ENCOUNTER — OFFICE VISIT (OUTPATIENT)
Dept: UROLOGY | Facility: CLINIC | Age: 58
End: 2023-01-24
Payer: COMMERCIAL

## 2023-01-24 ENCOUNTER — HOSPITAL ENCOUNTER (OUTPATIENT)
Dept: RADIOLOGY | Facility: HOSPITAL | Age: 58
Discharge: HOME | End: 2023-01-24
Attending: UROLOGY
Payer: COMMERCIAL

## 2023-01-24 VITALS
HEART RATE: 63 BPM | DIASTOLIC BLOOD PRESSURE: 103 MMHG | HEIGHT: 66 IN | SYSTOLIC BLOOD PRESSURE: 163 MMHG | WEIGHT: 183 LBS | BODY MASS INDEX: 29.41 KG/M2

## 2023-01-24 DIAGNOSIS — N50.89 MASS OF RIGHT TESTICLE: ICD-10-CM

## 2023-01-24 DIAGNOSIS — I10 ESSENTIAL HYPERTENSION: ICD-10-CM

## 2023-01-24 DIAGNOSIS — N50.89 SCROTAL MASS: ICD-10-CM

## 2023-01-24 DIAGNOSIS — N45.1 EPIDIDYMITIS, RIGHT: Primary | ICD-10-CM

## 2023-01-24 PROCEDURE — 1160F RVW MEDS BY RX/DR IN RCRD: CPT | Mod: ,,, | Performed by: UROLOGY

## 2023-01-24 PROCEDURE — 76870 US SCROTUM AND TESTICLES: ICD-10-PCS | Mod: 26,,, | Performed by: RADIOLOGY

## 2023-01-24 PROCEDURE — 3080F DIAST BP >= 90 MM HG: CPT | Mod: ,,, | Performed by: UROLOGY

## 2023-01-24 PROCEDURE — 3077F SYST BP >= 140 MM HG: CPT | Mod: ,,, | Performed by: UROLOGY

## 2023-01-24 PROCEDURE — 1159F PR MEDICATION LIST DOCUMENTED IN MEDICAL RECORD: ICD-10-PCS | Mod: ,,, | Performed by: UROLOGY

## 2023-01-24 PROCEDURE — 76870 US EXAM SCROTUM: CPT | Mod: TC

## 2023-01-24 PROCEDURE — 99213 OFFICE O/P EST LOW 20 MIN: CPT | Mod: S$PBB,,, | Performed by: UROLOGY

## 2023-01-24 PROCEDURE — 3008F PR BODY MASS INDEX (BMI) DOCUMENTED: ICD-10-PCS | Mod: ,,, | Performed by: UROLOGY

## 2023-01-24 PROCEDURE — 1159F MED LIST DOCD IN RCRD: CPT | Mod: ,,, | Performed by: UROLOGY

## 2023-01-24 PROCEDURE — 99214 OFFICE O/P EST MOD 30 MIN: CPT | Mod: PBBFAC | Performed by: UROLOGY

## 2023-01-24 PROCEDURE — 76870 US EXAM SCROTUM: CPT | Mod: 26,,, | Performed by: RADIOLOGY

## 2023-01-24 PROCEDURE — 99213 PR OFFICE/OUTPT VISIT, EST, LEVL III, 20-29 MIN: ICD-10-PCS | Mod: S$PBB,,, | Performed by: UROLOGY

## 2023-01-24 PROCEDURE — 1160F PR REVIEW ALL MEDS BY PRESCRIBER/CLIN PHARMACIST DOCUMENTED: ICD-10-PCS | Mod: ,,, | Performed by: UROLOGY

## 2023-01-24 PROCEDURE — 3080F PR MOST RECENT DIASTOLIC BLOOD PRESSURE >= 90 MM HG: ICD-10-PCS | Mod: ,,, | Performed by: UROLOGY

## 2023-01-24 PROCEDURE — 3008F BODY MASS INDEX DOCD: CPT | Mod: ,,, | Performed by: UROLOGY

## 2023-01-24 PROCEDURE — 3077F PR MOST RECENT SYSTOLIC BLOOD PRESSURE >= 140 MM HG: ICD-10-PCS | Mod: ,,, | Performed by: UROLOGY

## 2023-01-24 RX ORDER — DOXYCYCLINE 100 MG/1
100 CAPSULE ORAL 2 TIMES DAILY
Qty: 30 CAPSULE | Refills: 1 | Status: SHIPPED | OUTPATIENT
Start: 2023-01-24 | End: 2023-04-24 | Stop reason: SDUPTHER

## 2023-01-24 NOTE — PROGRESS NOTES
Subjective:       Patient ID: Eugenio Judge is a 57 y.o. male.    Chief Complaint: Follow-up (Scrotal US)       History of Present Illness  Mr. Judge is 47 years old. He was in his usual state of health until Saturday, September 29 when he had onset of left mid abdominal pain that was rather severe. It is associated with nausea but no vomiting. Pain was so severe he had to go to emergency room. He had a couple episodes of pain in the same distribution of less intensity in recent months but nothing like he had on the day requiring trip to the emergency room. He was found to have a stone in the mid ureter. In now for followup of stone. He has no history of previous stone problems. He has no voiding difficulty and has not noted hematuria. He is not aware of any previous urinary tract infection or any other  problems. No history of prostate problems or other  difficulty. He was given Lortabs in the emergency room. His last pain medication was taken about 6 AM today.  Patient's father is my patient also.     The above-note is the note of October 3, 2012. Mr. Judge has had very minimal pain since he was here that day. He has taken very little Dilaudid that was given but it did work better than he did taking 10 Lortabs daily. Totally asymptomatic today.     Last note above his of November 9, 2012. Mr. Judge has had pain about 2 weeks ago that was typical of ureteral colic but short-lived and took only 2 Dilaudids. Basically back to baseline now.     Last note above is note of January 10, 2013. Patient has had essentially no pain during that time and has not had any activity of  the stone except maybe some slight twinges. He has required no pain medication. He has had the stone in the left ureter since September 29, 2012. CT scan was done September 29. Feeling fine today.  (June 18, 2013)     Mr. Judge returned today for his CT scan and followup on the stone in the left ureter which has been present for nearly a  year. Still clinically asymptomatic with no pain in many months.  (July 22, 2013  -----------------------------------------------------------------------------------------------------------------------------------------------------------------------------------------------------------------------------------------------------------------------------------------------------------------------  Above notes are from the old electronic medical record.     Mr. Judge went several years with no problems from stones but had onset of severe right ureteral colic on September 10th.  Necessitated a trip to the emergency room on that day.  He was given script for Toradol and was found to have a small calcification in line with the distal right ureter.  He also has multiple calculi overlying both kidneys that are relatively small.  He has had no pain since a few days after that trip to the emergency room and has not been aware of stone passing.    He also had questions about possible testicular hypogonadism but he had a serum testosterone of 328 which is normal and his free testosterone was in normal range also.  Needs renewal of sildenafil.  Feeling okay at present except for fatigue. (October 7, 2021)     Mr. Judge is in for yearly checkup.  He passed a stone in late May and another 1 on August 5th with minimal discomfort  and did not require pain medication.  He has had no worsening bladder outlet obstructive symptoms and otherwise has had a reasonably good year except for problems of complication from COVID vaccine last October 31st.  Blood pressure elevated today but apparently does okay most of the time. [November 7, 2022]     Mr. Judge is in ahead of planned visit because he has found a mass above his right testis that was found on [place] [address].  It is asymptomatic other than presence.  Feels firm but nontender. [January 9, 2023]     Mr. Judge is in for 2 week follow-up with his scrotal ultrasound for the mass felt to  be at the upper end of his right testis.  He has not had any major trouble and has more difficulty finding the mass now than he did before although it is still present.  No fever, increased tenderness, or any other suggestion of active problems.  [January 24, 2023]  Review of Systems      Objective:      Physical Exam    Assessment:       Problem List Items Addressed This Visit    None  Visit Diagnoses       Epididymitis, right    -  Primary    Scrotal mass        Essential hypertension                Plan:         Scrotal ultrasound reviewed with patient.  There is enlargement and abnormality of the globus major of the right epididymis compatible with epididymitis.  No other abnormalities noted.  No evidence of adenomatoid tumor of epididymis.  Patient is getting better clinically and think that he does have a mild epididymitis that is resolving.  We will treat with doxycycline.  I will see again in 3 months or sooner for worsening.

## 2023-01-25 NOTE — PATIENT INSTRUCTIONS
Scrotal ultrasound reviewed with patient.  There is enlargement and abnormality of the globus major of the right epididymis compatible with epididymitis.  No other abnormalities noted.  No evidence of adenomatoid tumor of epididymis.  Patient is getting better clinically and think that he does have a mild epididymitis that is resolving.  We will treat with doxycycline.  I will see again in 3 months or sooner for worsening.

## 2023-04-24 ENCOUNTER — OFFICE VISIT (OUTPATIENT)
Dept: UROLOGY | Facility: CLINIC | Age: 58
End: 2023-04-24
Payer: COMMERCIAL

## 2023-04-24 VITALS
HEART RATE: 65 BPM | BODY MASS INDEX: 29.41 KG/M2 | WEIGHT: 183 LBS | DIASTOLIC BLOOD PRESSURE: 95 MMHG | SYSTOLIC BLOOD PRESSURE: 152 MMHG | HEIGHT: 66 IN

## 2023-04-24 DIAGNOSIS — N20.0 NEPHROLITHIASIS: ICD-10-CM

## 2023-04-24 DIAGNOSIS — N45.1 EPIDIDYMITIS: ICD-10-CM

## 2023-04-24 DIAGNOSIS — Z12.5 SCREENING FOR PROSTATE CANCER: ICD-10-CM

## 2023-04-24 DIAGNOSIS — N41.1 CHRONIC PROSTATITIS: Primary | ICD-10-CM

## 2023-04-24 PROCEDURE — 3080F PR MOST RECENT DIASTOLIC BLOOD PRESSURE >= 90 MM HG: ICD-10-PCS | Mod: ,,, | Performed by: UROLOGY

## 2023-04-24 PROCEDURE — 99213 PR OFFICE/OUTPT VISIT, EST, LEVL III, 20-29 MIN: ICD-10-PCS | Mod: S$PBB,,, | Performed by: UROLOGY

## 2023-04-24 PROCEDURE — 3008F PR BODY MASS INDEX (BMI) DOCUMENTED: ICD-10-PCS | Mod: ,,, | Performed by: UROLOGY

## 2023-04-24 PROCEDURE — 3077F SYST BP >= 140 MM HG: CPT | Mod: ,,, | Performed by: UROLOGY

## 2023-04-24 PROCEDURE — 1159F PR MEDICATION LIST DOCUMENTED IN MEDICAL RECORD: ICD-10-PCS | Mod: ,,, | Performed by: UROLOGY

## 2023-04-24 PROCEDURE — 3080F DIAST BP >= 90 MM HG: CPT | Mod: ,,, | Performed by: UROLOGY

## 2023-04-24 PROCEDURE — 99213 OFFICE O/P EST LOW 20 MIN: CPT | Mod: S$PBB,,, | Performed by: UROLOGY

## 2023-04-24 PROCEDURE — 1159F MED LIST DOCD IN RCRD: CPT | Mod: ,,, | Performed by: UROLOGY

## 2023-04-24 PROCEDURE — 3077F PR MOST RECENT SYSTOLIC BLOOD PRESSURE >= 140 MM HG: ICD-10-PCS | Mod: ,,, | Performed by: UROLOGY

## 2023-04-24 PROCEDURE — 99214 OFFICE O/P EST MOD 30 MIN: CPT | Mod: PBBFAC | Performed by: UROLOGY

## 2023-04-24 PROCEDURE — 1160F RVW MEDS BY RX/DR IN RCRD: CPT | Mod: ,,, | Performed by: UROLOGY

## 2023-04-24 PROCEDURE — 3008F BODY MASS INDEX DOCD: CPT | Mod: ,,, | Performed by: UROLOGY

## 2023-04-24 PROCEDURE — 1160F PR REVIEW ALL MEDS BY PRESCRIBER/CLIN PHARMACIST DOCUMENTED: ICD-10-PCS | Mod: ,,, | Performed by: UROLOGY

## 2023-04-24 RX ORDER — DOXYCYCLINE 100 MG/1
100 CAPSULE ORAL 2 TIMES DAILY
Qty: 30 CAPSULE | Refills: 1 | Status: SHIPPED | OUTPATIENT
Start: 2023-04-24 | End: 2023-07-05

## 2023-04-24 NOTE — PROGRESS NOTES
Subjective     Patient ID: Eugenio Judge is a 57 y.o. male.    Chief Complaint: Follow-up (3 month visit)    History of Present Illness  Mr. Judge is 47 years old. He was in his usual state of health until Saturday, September 29 when he had onset of left mid abdominal pain that was rather severe. It is associated with nausea but no vomiting. Pain was so severe he had to go to emergency room. He had a couple episodes of pain in the same distribution of less intensity in recent months but nothing like he had on the day requiring trip to the emergency room. He was found to have a stone in the mid ureter. In now for followup of stone. He has no history of previous stone problems. He has no voiding difficulty and has not noted hematuria. He is not aware of any previous urinary tract infection or any other  problems. No history of prostate problems or other  difficulty. He was given Lortabs in the emergency room. His last pain medication was taken about 6 AM today.  Patient's father is my patient also.     The above-note is the note of October 3, 2012. Mr. Judge has had very minimal pain since he was here that day. He has taken very little Dilaudid that was given but it did work better than he did taking 10 Lortabs daily. Totally asymptomatic today.     Last note above his of November 9, 2012. Mr. Judge has had pain about 2 weeks ago that was typical of ureteral colic but short-lived and took only 2 Dilaudids. Basically back to baseline now.     Last note above is note of January 10, 2013. Patient has had essentially no pain during that time and has not had any activity of  the stone except maybe some slight twinges. He has required no pain medication. He has had the stone in the left ureter since September 29, 2012. CT scan was done September 29. Feeling fine today.  (June 18, 2013)     Mr. Judge returned today for his CT scan and followup on the stone in the left ureter which has been present for nearly a  year. Still clinically asymptomatic with no pain in many months.  (July 22, 2013  -----------------------------------------------------------------------------------------------------------------------------------------------------------------------------------------------------------------------------------------------------------------------------------------------------------------------  Above notes are from the old electronic medical record.     Mr. Judge went several years with no problems from stones but had onset of severe right ureteral colic on September 10th.  Necessitated a trip to the emergency room on that day.  He was given script for Toradol and was found to have a small calcification in line with the distal right ureter.  He also has multiple calculi overlying both kidneys that are relatively small.  He has had no pain since a few days after that trip to the emergency room and has not been aware of stone passing.    He also had questions about possible testicular hypogonadism but he had a serum testosterone of 328 which is normal and his free testosterone was in normal range also.  Needs renewal of sildenafil.  Feeling okay at present except for fatigue. (October 7, 2021)     Mr. Judge is in for yearly checkup.  He passed a stone in late May and another 1 on August 5th with minimal discomfort  and did not require pain medication.  He has had no worsening bladder outlet obstructive symptoms and otherwise has had a reasonably good year except for problems of complication from COVID vaccine last October 31st.  Blood pressure elevated today but apparently does okay most of the time. [November 7, 2022]     Mr. Judge is in ahead of planned visit because he has found a mass above his right testis that was found on [place] Rohini.  It is asymptomatic other than presence.  Feels firm but nontender. [January 9, 2023]     Mr. Judge is in for 2 week follow-up with his scrotal ultrasound for the mass felt to  be at the upper end of his right testis.  He has not had any major trouble and has more difficulty finding the mass now than he did before although it is still present.  No fever, increased tenderness, or any other suggestion of active problems.  [January 24, 2023]    Mr. Judge was having testalgia on last visit and the ultrasound suggested probable mild epididymitis.  He responded to antimicrobials with doxycycline.  Asymptomatic now and feels his testicles are normal.  No new voiding problems etc. no new stone problems. [April 24, 2023]  Review of Systems       Objective     Physical Exam  Constitutional:       Appearance: Normal appearance. He is normal weight.   Genitourinary:     Testes: Normal.      Comments: Examination of scrotum reveals no evidence of epididymitis.  Both testicles feel normal.  Neurological:      Mental Status: He is alert.   Psychiatric:         Mood and Affect: Mood normal.         Behavior: Behavior normal.      Urinalysis revealed 0-2 pus cells per high-powered field with occasional epithelial cells.  The dipstick had a faint trace of blood with 1+ leukocytes, pH 5.0, and specific gravity 1.005  Assessment and Plan     Problem List Items Addressed This Visit    None  Visit Diagnoses       Chronic prostatitis    -  Primary    Epididymitis        Nephrolithiasis        Screening for prostate cancer             Patient given prescription for doxycycline in case he has flare-up of his problem that we feel his most likely epididymitis.  No recent stone problems.  He will keep November 7th appointment for follow-up of nephrolithiasis.  Call for problems prior to that time.

## 2023-04-25 NOTE — PATIENT INSTRUCTIONS
Patient given prescription for doxycycline in case he has flare-up of his problem that we feel his most likely epididymitis.  No recent stone problems.  He will keep November 7th appointment for follow-up of nephrolithiasis.  Call for problems prior to that time.

## 2023-05-22 ENCOUNTER — OFFICE VISIT (OUTPATIENT)
Dept: FAMILY MEDICINE | Facility: CLINIC | Age: 58
End: 2023-05-22
Payer: COMMERCIAL

## 2023-05-22 VITALS
BODY MASS INDEX: 30.44 KG/M2 | HEART RATE: 62 BPM | OXYGEN SATURATION: 96 % | HEIGHT: 66 IN | SYSTOLIC BLOOD PRESSURE: 140 MMHG | RESPIRATION RATE: 18 BRPM | WEIGHT: 189.38 LBS | TEMPERATURE: 98 F | DIASTOLIC BLOOD PRESSURE: 90 MMHG

## 2023-05-22 DIAGNOSIS — L23.7 POISON IVY DERMATITIS: Primary | ICD-10-CM

## 2023-05-22 PROCEDURE — 3008F PR BODY MASS INDEX (BMI) DOCUMENTED: ICD-10-PCS | Mod: ICN,,, | Performed by: NURSE PRACTITIONER

## 2023-05-22 PROCEDURE — 3077F SYST BP >= 140 MM HG: CPT | Mod: ICN,,, | Performed by: NURSE PRACTITIONER

## 2023-05-22 PROCEDURE — 96372 PR INJECTION,THERAP/PROPH/DIAG2ST, IM OR SUBCUT: ICD-10-PCS | Mod: ICN,,, | Performed by: NURSE PRACTITIONER

## 2023-05-22 PROCEDURE — 1159F PR MEDICATION LIST DOCUMENTED IN MEDICAL RECORD: ICD-10-PCS | Mod: ICN,,, | Performed by: NURSE PRACTITIONER

## 2023-05-22 PROCEDURE — 3080F PR MOST RECENT DIASTOLIC BLOOD PRESSURE >= 90 MM HG: ICD-10-PCS | Mod: ICN,,, | Performed by: NURSE PRACTITIONER

## 2023-05-22 PROCEDURE — 3077F PR MOST RECENT SYSTOLIC BLOOD PRESSURE >= 140 MM HG: ICD-10-PCS | Mod: ICN,,, | Performed by: NURSE PRACTITIONER

## 2023-05-22 PROCEDURE — 1159F MED LIST DOCD IN RCRD: CPT | Mod: ICN,,, | Performed by: NURSE PRACTITIONER

## 2023-05-22 PROCEDURE — 99213 OFFICE O/P EST LOW 20 MIN: CPT | Mod: 25,ICN,, | Performed by: NURSE PRACTITIONER

## 2023-05-22 PROCEDURE — 96372 THER/PROPH/DIAG INJ SC/IM: CPT | Mod: ICN,,, | Performed by: NURSE PRACTITIONER

## 2023-05-22 PROCEDURE — 3080F DIAST BP >= 90 MM HG: CPT | Mod: ICN,,, | Performed by: NURSE PRACTITIONER

## 2023-05-22 PROCEDURE — 3008F BODY MASS INDEX DOCD: CPT | Mod: ICN,,, | Performed by: NURSE PRACTITIONER

## 2023-05-22 PROCEDURE — 99213 PR OFFICE/OUTPT VISIT, EST, LEVL III, 20-29 MIN: ICD-10-PCS | Mod: 25,ICN,, | Performed by: NURSE PRACTITIONER

## 2023-05-22 RX ORDER — FAMOTIDINE 20 MG/1
20 TABLET, FILM COATED ORAL 2 TIMES DAILY PRN
Qty: 14 TABLET | Refills: 0 | Status: SHIPPED | OUTPATIENT
Start: 2023-05-22 | End: 2023-07-05

## 2023-05-22 RX ORDER — DEXAMETHASONE SODIUM PHOSPHATE 4 MG/ML
8 INJECTION, SOLUTION INTRA-ARTICULAR; INTRALESIONAL; INTRAMUSCULAR; INTRAVENOUS; SOFT TISSUE ONCE
Status: COMPLETED | OUTPATIENT
Start: 2023-05-22 | End: 2023-05-22

## 2023-05-22 RX ORDER — HYDROXYZINE PAMOATE 25 MG/1
25 CAPSULE ORAL EVERY 4 HOURS PRN
Qty: 30 CAPSULE | Refills: 0 | Status: SHIPPED | OUTPATIENT
Start: 2023-05-22 | End: 2023-07-05

## 2023-05-22 RX ORDER — METHYLPREDNISOLONE 4 MG/1
TABLET ORAL
Qty: 21 EACH | Refills: 0 | Status: SHIPPED | OUTPATIENT
Start: 2023-05-22 | End: 2023-06-12

## 2023-05-22 RX ADMIN — DEXAMETHASONE SODIUM PHOSPHATE 8 MG: 4 INJECTION, SOLUTION INTRA-ARTICULAR; INTRALESIONAL; INTRAMUSCULAR; INTRAVENOUS; SOFT TISSUE at 03:05

## 2023-05-22 NOTE — PROGRESS NOTES
Subjective:       Patient ID: Eugenio Judge is a 57 y.o. male.    Chief Complaint: Poison Ivy (Got into it last Monday. All over body)    Poison Ivy dermatitis- widespread  Review of Systems   Constitutional:  Negative for appetite change, fatigue and fever.   HENT:  Negative for nasal congestion, ear pain and sore throat.    Eyes:  Negative for pain, discharge and itching.   Respiratory:  Negative for cough and shortness of breath.    Cardiovascular:  Negative for chest pain and leg swelling.   Gastrointestinal:  Negative for abdominal pain, change in bowel habit, nausea, vomiting and change in bowel habit.   Musculoskeletal:  Negative for back pain, gait problem and neck pain.   Integumentary:  Positive for rash. Negative for wound.   Neurological:  Negative for dizziness, weakness and headaches.   All other systems reviewed and are negative.      Objective:      Physical Exam  Vitals and nursing note reviewed.   Constitutional:       General: He is not in acute distress.     Appearance: Normal appearance. He is normal weight. He is not ill-appearing, toxic-appearing or diaphoretic.   Cardiovascular:      Rate and Rhythm: Normal rate and regular rhythm.      Pulses: Normal pulses.      Heart sounds: Normal heart sounds.   Pulmonary:      Effort: Pulmonary effort is normal.      Breath sounds: Normal breath sounds.   Skin:     General: Skin is warm and dry.      Findings: Rash present. Rash is vesicular.      Comments: Multiple erythematous maculopapular and vesicular lesion in patches and linear patterns noted widespread to exposed areas of the legs, chest and arms   Neurological:      Mental Status: He is alert and oriented to person, place, and time.   Psychiatric:         Mood and Affect: Mood normal.         Behavior: Behavior normal.         Thought Content: Thought content normal.         Judgment: Judgment normal.          Assessment:       1. Poison ivy dermatitis        Plan:   Poison ivy dermatitis  -      dexAMETHasone injection 8 mg  -     methylPREDNISolone (MEDROL DOSEPACK) 4 mg tablet; use as directed  Dispense: 21 each; Refill: 0  -     hydrOXYzine pamoate (VISTARIL) 25 MG Cap; Take 1 capsule (25 mg total) by mouth every 4 (four) hours as needed (itching).  Dispense: 30 capsule; Refill: 0  -     famotidine (PEPCID) 20 MG tablet; Take 1 tablet (20 mg total) by mouth 2 (two) times daily as needed (rash).  Dispense: 14 tablet; Refill: 0         Risks, benefits, and side effects were discussed with the patient. All questions were answered to the fullest satisfaction of the patient, and pt verbalized understanding and agreement to treatment plan. Pt was to call with any new or worsening symptoms, or present to the ER

## 2023-07-05 ENCOUNTER — OFFICE VISIT (OUTPATIENT)
Dept: FAMILY MEDICINE | Facility: CLINIC | Age: 58
End: 2023-07-05
Payer: COMMERCIAL

## 2023-07-05 VITALS
HEART RATE: 69 BPM | BODY MASS INDEX: 27.7 KG/M2 | OXYGEN SATURATION: 100 % | SYSTOLIC BLOOD PRESSURE: 144 MMHG | RESPIRATION RATE: 18 BRPM | DIASTOLIC BLOOD PRESSURE: 100 MMHG | WEIGHT: 172.38 LBS | TEMPERATURE: 98 F | HEIGHT: 66 IN

## 2023-07-05 DIAGNOSIS — L23.7 POISON IVY: Primary | ICD-10-CM

## 2023-07-05 PROCEDURE — 1159F MED LIST DOCD IN RCRD: CPT | Mod: ICN,,, | Performed by: FAMILY MEDICINE

## 2023-07-05 PROCEDURE — 96372 THER/PROPH/DIAG INJ SC/IM: CPT | Mod: ICN,,, | Performed by: FAMILY MEDICINE

## 2023-07-05 PROCEDURE — 1159F PR MEDICATION LIST DOCUMENTED IN MEDICAL RECORD: ICD-10-PCS | Mod: ICN,,, | Performed by: FAMILY MEDICINE

## 2023-07-05 PROCEDURE — 1160F PR REVIEW ALL MEDS BY PRESCRIBER/CLIN PHARMACIST DOCUMENTED: ICD-10-PCS | Mod: ICN,,, | Performed by: FAMILY MEDICINE

## 2023-07-05 PROCEDURE — 96372 PR INJECTION,THERAP/PROPH/DIAG2ST, IM OR SUBCUT: ICD-10-PCS | Mod: ICN,,, | Performed by: FAMILY MEDICINE

## 2023-07-05 PROCEDURE — 99214 OFFICE O/P EST MOD 30 MIN: CPT | Mod: 25,ICN,, | Performed by: FAMILY MEDICINE

## 2023-07-05 PROCEDURE — 3077F SYST BP >= 140 MM HG: CPT | Mod: ICN,,, | Performed by: FAMILY MEDICINE

## 2023-07-05 PROCEDURE — 3080F PR MOST RECENT DIASTOLIC BLOOD PRESSURE >= 90 MM HG: ICD-10-PCS | Mod: ICN,,, | Performed by: FAMILY MEDICINE

## 2023-07-05 PROCEDURE — 3008F PR BODY MASS INDEX (BMI) DOCUMENTED: ICD-10-PCS | Mod: ICN,,, | Performed by: FAMILY MEDICINE

## 2023-07-05 PROCEDURE — 1160F RVW MEDS BY RX/DR IN RCRD: CPT | Mod: ICN,,, | Performed by: FAMILY MEDICINE

## 2023-07-05 PROCEDURE — 3008F BODY MASS INDEX DOCD: CPT | Mod: ICN,,, | Performed by: FAMILY MEDICINE

## 2023-07-05 PROCEDURE — 3080F DIAST BP >= 90 MM HG: CPT | Mod: ICN,,, | Performed by: FAMILY MEDICINE

## 2023-07-05 PROCEDURE — 3077F PR MOST RECENT SYSTOLIC BLOOD PRESSURE >= 140 MM HG: ICD-10-PCS | Mod: ICN,,, | Performed by: FAMILY MEDICINE

## 2023-07-05 PROCEDURE — 99214 PR OFFICE/OUTPT VISIT, EST, LEVL IV, 30-39 MIN: ICD-10-PCS | Mod: 25,ICN,, | Performed by: FAMILY MEDICINE

## 2023-07-05 RX ORDER — PREDNISONE 20 MG/1
40 TABLET ORAL DAILY
Qty: 10 TABLET | Refills: 0 | Status: SHIPPED | OUTPATIENT
Start: 2023-07-05 | End: 2023-07-10

## 2023-07-05 RX ORDER — DEXAMETHASONE SODIUM PHOSPHATE 4 MG/ML
6 INJECTION, SOLUTION INTRA-ARTICULAR; INTRALESIONAL; INTRAMUSCULAR; INTRAVENOUS; SOFT TISSUE
Status: COMPLETED | OUTPATIENT
Start: 2023-07-05 | End: 2023-07-05

## 2023-07-05 RX ADMIN — DEXAMETHASONE SODIUM PHOSPHATE 6 MG: 4 INJECTION, SOLUTION INTRA-ARTICULAR; INTRALESIONAL; INTRAMUSCULAR; INTRAVENOUS; SOFT TISSUE at 04:07

## 2023-08-24 ENCOUNTER — PATIENT OUTREACH (OUTPATIENT)
Dept: ADMINISTRATIVE | Facility: HOSPITAL | Age: 58
End: 2023-08-24

## 2023-08-24 NOTE — PROGRESS NOTES
8/24/2023   --Chart accessed for: care gaps  --Care Gaps addressed:HTN  Outreach made to patient via portal . (Success) (Left Message) (Unavailable)   Patient stated N/A.  Care Everywhere updates requested and reviewed.  Media reports reviewed.  [facility] and [facility] reviewed.  Immunization Database ([employer]) reviewed. Vaccinations uploaded:   updated with external NO Report  NO Requested records   Next appointment 11/07/2023 . Appointment notes updated to include: urology     Health Maintenance Due   Topic Date Due    Hepatitis C Screening  Never done    HIV Screening  Never done    Hemoglobin A1c (Diabetic Prevention Screening)  Never done    Colorectal Cancer Screening  Never done    Shingles Vaccine (1 of 2) Never done    COVID-19 Vaccine (3 - Pfizer series) 12/23/2021

## 2023-08-24 NOTE — LETTER
August 24, 2023    Eugenio Len Judge  [address] [id number]              Dear Mr. Judge:    On your last office visit your blood pressure was 144/100 which is elevated. We would like for you to come by the clinic for a nurse visit for you to have your blood pressure rechecked.      If you have any questions or concerns, please don't hesitate to call.    Sincerely,        Jad England LPN  Care Coordinator  Phone [phone]  Fax [phone]

## 2023-11-06 ENCOUNTER — HOSPITAL ENCOUNTER (OUTPATIENT)
Dept: RADIOLOGY | Facility: HOSPITAL | Age: 58
Discharge: HOME | End: 2023-11-06
Attending: UROLOGY
Payer: COMMERCIAL

## 2023-11-06 DIAGNOSIS — N20.0 KIDNEY STONES: ICD-10-CM

## 2023-11-06 PROCEDURE — 74018 RADEX ABDOMEN 1 VIEW: CPT | Mod: TC

## 2023-11-07 ENCOUNTER — OFFICE VISIT (OUTPATIENT)
Dept: UROLOGY | Facility: CLINIC | Age: 58
End: 2023-11-07
Payer: COMMERCIAL

## 2023-11-07 VITALS
DIASTOLIC BLOOD PRESSURE: 96 MMHG | HEIGHT: 66 IN | WEIGHT: 182 LBS | SYSTOLIC BLOOD PRESSURE: 142 MMHG | BODY MASS INDEX: 29.25 KG/M2 | HEART RATE: 67 BPM

## 2023-11-07 DIAGNOSIS — I10 ESSENTIAL HYPERTENSION: ICD-10-CM

## 2023-11-07 DIAGNOSIS — N20.0 NEPHROLITHIASIS: Primary | ICD-10-CM

## 2023-11-07 DIAGNOSIS — Z12.5 SCREENING FOR PROSTATE CANCER: ICD-10-CM

## 2023-11-07 DIAGNOSIS — N41.1 CHRONIC PROSTATITIS: ICD-10-CM

## 2023-11-07 DIAGNOSIS — D29.30 ADENOMATOID TUMOR OF EPIDIDYMIS: ICD-10-CM

## 2023-11-07 DIAGNOSIS — N52.9 ERECTILE DYSFUNCTION, UNSPECIFIED ERECTILE DYSFUNCTION TYPE: ICD-10-CM

## 2023-11-07 PROCEDURE — 3080F DIAST BP >= 90 MM HG: CPT | Mod: ,,, | Performed by: UROLOGY

## 2023-11-07 PROCEDURE — 99214 PR OFFICE/OUTPT VISIT, EST, LEVL IV, 30-39 MIN: ICD-10-PCS | Mod: S$PBB,,, | Performed by: UROLOGY

## 2023-11-07 PROCEDURE — 1159F PR MEDICATION LIST DOCUMENTED IN MEDICAL RECORD: ICD-10-PCS | Mod: ,,, | Performed by: UROLOGY

## 2023-11-07 PROCEDURE — 3080F PR MOST RECENT DIASTOLIC BLOOD PRESSURE >= 90 MM HG: ICD-10-PCS | Mod: ,,, | Performed by: UROLOGY

## 2023-11-07 PROCEDURE — 3077F SYST BP >= 140 MM HG: CPT | Mod: ,,, | Performed by: UROLOGY

## 2023-11-07 PROCEDURE — 3008F PR BODY MASS INDEX (BMI) DOCUMENTED: ICD-10-PCS | Mod: ,,, | Performed by: UROLOGY

## 2023-11-07 PROCEDURE — 1160F RVW MEDS BY RX/DR IN RCRD: CPT | Mod: ,,, | Performed by: UROLOGY

## 2023-11-07 PROCEDURE — 99214 OFFICE O/P EST MOD 30 MIN: CPT | Mod: PBBFAC | Performed by: UROLOGY

## 2023-11-07 PROCEDURE — 99214 OFFICE O/P EST MOD 30 MIN: CPT | Mod: S$PBB,,, | Performed by: UROLOGY

## 2023-11-07 PROCEDURE — 1160F PR REVIEW ALL MEDS BY PRESCRIBER/CLIN PHARMACIST DOCUMENTED: ICD-10-PCS | Mod: ,,, | Performed by: UROLOGY

## 2023-11-07 PROCEDURE — 3077F PR MOST RECENT SYSTOLIC BLOOD PRESSURE >= 140 MM HG: ICD-10-PCS | Mod: ,,, | Performed by: UROLOGY

## 2023-11-07 PROCEDURE — 3008F BODY MASS INDEX DOCD: CPT | Mod: ,,, | Performed by: UROLOGY

## 2023-11-07 PROCEDURE — 1159F MED LIST DOCD IN RCRD: CPT | Mod: ,,, | Performed by: UROLOGY

## 2023-11-07 RX ORDER — SILDENAFIL CITRATE 20 MG/1
TABLET ORAL
COMMUNITY
Start: 2023-10-06 | End: 2023-11-07 | Stop reason: SDUPTHER

## 2023-11-07 RX ORDER — SILDENAFIL CITRATE 20 MG/1
TABLET ORAL
Qty: 30 TABLET | Refills: 11 | Status: SHIPPED | OUTPATIENT
Start: 2023-11-07

## 2023-11-07 RX ORDER — AMLODIPINE BESYLATE 5 MG/1
5 TABLET ORAL DAILY
Qty: 30 TABLET | Refills: 11 | Status: SHIPPED | OUTPATIENT
Start: 2023-11-07 | End: 2024-11-06

## 2023-11-07 NOTE — PROGRESS NOTES
Subjective     Patient ID: Eugenio Judge is a 58 y.o. male.    Chief Complaint: Follow-up (One year PSA, KUB and visit. )       History of Present Illness  Mr. Judge is 47 years old. He was in his usual state of health until Saturday, September 29 when he had onset of left mid abdominal pain that was rather severe. It is associated with nausea but no vomiting. Pain was so severe he had to go to emergency room. He had a couple episodes of pain in the same distribution of less intensity in recent months but nothing like he had on the day requiring trip to the emergency room. He was found to have a stone in the mid ureter. In now for followup of stone. He has no history of previous stone problems. He has no voiding difficulty and has not noted hematuria. He is not aware of any previous urinary tract infection or any other  problems. No history of prostate problems or other  difficulty. He was given Lortabs in the emergency room. His last pain medication was taken about 6 AM today.  Patient's father is my patient also.     The above-note is the note of October 3, 2012. Mr. Judge has had very minimal pain since he was here that day. He has taken very little Dilaudid that was given but it did work better than he did taking 10 Lortabs daily. Totally asymptomatic today.     Last note above his of November 9, 2012. Mr. Judge has had pain about 2 weeks ago that was typical of ureteral colic but short-lived and took only 2 Dilaudids. Basically back to baseline now.     Last note above is note of January 10, 2013. Patient has had essentially no pain during that time and has not had any activity of  the stone except maybe some slight twinges. He has required no pain medication. He has had the stone in the left ureter since September 29, 2012. CT scan was done September 29. Feeling fine today.  (June 18, 2013)     Mr. Judge returned today for his CT scan and followup on the stone in the left ureter which has been  present for nearly a year. Still clinically asymptomatic with no pain in many months.  (July 22, 2013  -----------------------------------------------------------------------------------------------------------------------------------------------------------------------------------------------------------------------------------------------------------------------------------------------------------------------  Above notes are from the old electronic medical record.     Mr. Judge went several years with no problems from stones but had onset of severe right ureteral colic on September 10th.  Necessitated a trip to the emergency room on that day.  He was given script for Toradol and was found to have a small calcification in line with the distal right ureter.  He also has multiple calculi overlying both kidneys that are relatively small.  He has had no pain since a few days after that trip to the emergency room and has not been aware of stone passing.    He also had questions about possible testicular hypogonadism but he had a serum testosterone of 328 which is normal and his free testosterone was in normal range also.  Needs renewal of sildenafil.  Feeling okay at present except for fatigue. (October 7, 2021)     Mr. Judge is in for yearly checkup.  He passed a stone in late May and another 1 on August 5th with minimal discomfort  and did not require pain medication.  He has had no worsening bladder outlet obstructive symptoms and otherwise has had a reasonably good year except for problems of complication from COVID vaccine last October 31st.  Blood pressure elevated today but apparently does okay most of the time. [November 7, 2022]     Mr. Judge is in ahead of planned visit because he has found a mass above his right testis that was found on [place] [address].  It is asymptomatic other than presence.  Feels firm but nontender. [January 9, 2023]     Mr. Judge is in for 2 week follow-up with his scrotal ultrasound  for the mass felt to be at the upper end of his right testis.  He has not had any major trouble and has more difficulty finding the mass now than he did before although it is still present.  No fever, increased tenderness, or any other suggestion of active problems.  [January 24, 2023]     Mr. Judge was having testalgia on last visit and the ultrasound suggested probable mild epididymitis.  He responded to antimicrobials with doxycycline.  Asymptomatic now and feels his testicles are normal.  No new voiding problems etc. no new stone problems. [April 24, 2023]    Mr. Judge returned today for follow-up of nephrolithiasis.  He passed a stone in July and another 1 in August and had essentially no pain with passage of either 1 of them.  He has not had any recent testalgia like he did before.  Voiding okay.  Needs renewal of sildenafil.  Patient probably also needs a blood pressure medicine as his blood pressure runs a little bit high all the time.  He does not have a family physician currently.  PSA done yesterday normal at 1.190.  KUB reviewed with patient.  I think there are a few small stones possibly in both kidneys.  Nothing seen in line with the ureters.  [November 7, 2023]    Follow-up    Review of Systems       Objective     Physical Exam  Constitutional:       Appearance: Normal appearance. He is normal weight.   Cardiovascular:      Comments: Follow-up blood pressure 142/96  Genitourinary:     Prostate: Normal.      Rectum: Normal.      Comments: Prostate 20-25 g smooth and symmetrical  Neurological:      Mental Status: He is alert.   Psychiatric:         Mood and Affect: Mood normal.         Behavior: Behavior normal.     Urinalysis revealed occasional pus cells and very rare red cells.  The dipstick negative with pH 5.0 and specific gravity 1.010     Assessment and Plan     1. Nephrolithiasis    2. Chronic prostatitis    3. Screening for prostate cancer    4. Essential hypertension    5. Adenomatoid tumor  of epididymis    6. Erectile dysfunction, unspecified erectile dysfunction type    Other orders  -     sildenafil (REVATIO) 20 mg Tab; TAKE 2-5 TABLETS ONE (1) HOUR PRIOR TO INTERCOURSE  Dispense: 30 tablet; Refill: 11  -     amLODIPine (NORVASC) 5 MG tablet; Take 1 tablet (5 mg total) by mouth once daily.  Dispense: 30 tablet; Refill: 11      KUB reviewed with patient.  Probably has bilateral nephrolithiasis but do not see anything in line with the ureters.  Sildenafil renewed.  Amlodipine started because he needs blood pressure medication.  He will find family physician to continue the drug and do follow-up blood pressure readings.  He will continue to monitor at home also.  Follow-up blood pressure 142/96.  Patient understands I plan to retire next year.  If he has problems prior that time I will see him.  If he has acute colic he probably needs to have a renal colic CT scan          No follow-ups on file.

## 2023-11-08 NOTE — PATIENT INSTRUCTIONS
KUB reviewed with patient.  Probably has bilateral nephrolithiasis but do not see anything in line with the ureters.  Sildenafil renewed.  Amlodipine started because he needs blood pressure medication.  He will find family physician to continue the drug and do follow-up blood pressure readings.  He will continue to monitor at home also.  Follow-up blood pressure 142/96.  Patient understands I plan to retire next year.  If he has problems prior that time I will see him.  If he has acute colic he probably needs to have a renal colic CT scan.

## 2023-12-29 ENCOUNTER — OFFICE VISIT (OUTPATIENT)
Dept: FAMILY MEDICINE | Facility: CLINIC | Age: 58
End: 2023-12-29
Payer: COMMERCIAL

## 2023-12-29 VITALS
HEART RATE: 76 BPM | OXYGEN SATURATION: 97 % | TEMPERATURE: 98 F | RESPIRATION RATE: 20 BRPM | BODY MASS INDEX: 28.12 KG/M2 | HEIGHT: 66 IN | WEIGHT: 175 LBS | SYSTOLIC BLOOD PRESSURE: 133 MMHG | DIASTOLIC BLOOD PRESSURE: 94 MMHG

## 2023-12-29 DIAGNOSIS — J02.9 SORE THROAT: Primary | ICD-10-CM

## 2023-12-29 DIAGNOSIS — J02.9 ACUTE PHARYNGITIS, UNSPECIFIED ETIOLOGY: ICD-10-CM

## 2023-12-29 LAB
CTP QC/QA: YES
S PYO RRNA THROAT QL PROBE: NEGATIVE

## 2023-12-29 PROCEDURE — 87880 POCT RAPID STREP A: ICD-10-PCS | Mod: QW,,, | Performed by: FAMILY MEDICINE

## 2023-12-29 PROCEDURE — 3075F PR MOST RECENT SYSTOLIC BLOOD PRESS GE 130-139MM HG: ICD-10-PCS | Mod: ,,, | Performed by: FAMILY MEDICINE

## 2023-12-29 PROCEDURE — 96372 PR INJECTION,THERAP/PROPH/DIAG2ST, IM OR SUBCUT: ICD-10-PCS | Mod: ,,, | Performed by: FAMILY MEDICINE

## 2023-12-29 PROCEDURE — 3080F PR MOST RECENT DIASTOLIC BLOOD PRESSURE >= 90 MM HG: ICD-10-PCS | Mod: ,,, | Performed by: FAMILY MEDICINE

## 2023-12-29 PROCEDURE — 99213 PR OFFICE/OUTPT VISIT, EST, LEVL III, 20-29 MIN: ICD-10-PCS | Mod: 25,,, | Performed by: FAMILY MEDICINE

## 2023-12-29 PROCEDURE — 3080F DIAST BP >= 90 MM HG: CPT | Mod: ,,, | Performed by: FAMILY MEDICINE

## 2023-12-29 PROCEDURE — 3075F SYST BP GE 130 - 139MM HG: CPT | Mod: ,,, | Performed by: FAMILY MEDICINE

## 2023-12-29 PROCEDURE — 3008F PR BODY MASS INDEX (BMI) DOCUMENTED: ICD-10-PCS | Mod: ,,, | Performed by: FAMILY MEDICINE

## 2023-12-29 PROCEDURE — 87880 STREP A ASSAY W/OPTIC: CPT | Mod: QW,,, | Performed by: FAMILY MEDICINE

## 2023-12-29 PROCEDURE — 99213 OFFICE O/P EST LOW 20 MIN: CPT | Mod: 25,,, | Performed by: FAMILY MEDICINE

## 2023-12-29 PROCEDURE — 96372 THER/PROPH/DIAG INJ SC/IM: CPT | Mod: ,,, | Performed by: FAMILY MEDICINE

## 2023-12-29 PROCEDURE — 3008F BODY MASS INDEX DOCD: CPT | Mod: ,,, | Performed by: FAMILY MEDICINE

## 2023-12-29 RX ORDER — AMOXICILLIN 875 MG/1
875 TABLET, FILM COATED ORAL EVERY 12 HOURS
Qty: 20 TABLET | Refills: 0 | Status: SHIPPED | OUTPATIENT
Start: 2023-12-29

## 2023-12-29 RX ORDER — DEXAMETHASONE SODIUM PHOSPHATE 4 MG/ML
6 INJECTION, SOLUTION INTRA-ARTICULAR; INTRALESIONAL; INTRAMUSCULAR; INTRAVENOUS; SOFT TISSUE
Status: COMPLETED | OUTPATIENT
Start: 2023-12-29 | End: 2023-12-29

## 2023-12-29 RX ADMIN — DEXAMETHASONE SODIUM PHOSPHATE 6 MG: 4 INJECTION, SOLUTION INTRA-ARTICULAR; INTRALESIONAL; INTRAMUSCULAR; INTRAVENOUS; SOFT TISSUE at 03:12

## 2023-12-29 NOTE — PROGRESS NOTES
Subjective     Patient ID: Eugenio Judge is a 58 y.o. male.    Chief Complaint: Sore Throat    Sore throat began last night.  Patient thinks he may have had little fever.  Very mild cough.  Patient has had the cough for about 2 weeks.  He probably had influenza then.    Sore Throat       Review of Systems   HENT:  Positive for sore throat.           Objective     Physical Exam  Constitutional:       Appearance: He is not ill-appearing or toxic-appearing.   HENT:      Right Ear: Tympanic membrane normal.      Left Ear: Tympanic membrane normal.      Nose: Congestion present.      Mouth/Throat:      Pharynx: Posterior oropharyngeal erythema present.   Cardiovascular:      Rate and Rhythm: Normal rate and regular rhythm.   Pulmonary:      Effort: Pulmonary effort is normal.      Breath sounds: Normal breath sounds.   Lymphadenopathy:      Cervical: No cervical adenopathy.   Neurological:      Mental Status: He is alert.            Assessment and Plan     1. Sore throat  -     POCT rapid strep A    2. Acute pharyngitis, unspecified etiology    Other orders  -     dexAMETHasone injection 6 mg  -     amoxicillin (AMOXIL) 875 MG tablet; Take 1 tablet (875 mg total) by mouth every 12 (twelve) hours.  Dispense: 20 tablet; Refill: 0         rapid strep negative.  Probably a viral infection.  If symptoms continue or worsen he will have the antibiotic prescription filled         No follow-ups on file.

## 2024-01-03 ENCOUNTER — OFFICE VISIT (OUTPATIENT)
Dept: FAMILY MEDICINE | Facility: CLINIC | Age: 59
End: 2024-01-03
Payer: COMMERCIAL

## 2024-01-03 VITALS
DIASTOLIC BLOOD PRESSURE: 86 MMHG | SYSTOLIC BLOOD PRESSURE: 142 MMHG | WEIGHT: 175 LBS | TEMPERATURE: 98 F | OXYGEN SATURATION: 97 % | HEART RATE: 73 BPM | BODY MASS INDEX: 28.12 KG/M2 | HEIGHT: 66 IN

## 2024-01-03 DIAGNOSIS — J40 BRONCHITIS: Primary | ICD-10-CM

## 2024-01-03 DIAGNOSIS — Z20.828 EXPOSURE TO VIRAL DISEASE: ICD-10-CM

## 2024-01-03 LAB
CTP QC/QA: YES
CTP QC/QA: YES
FLUAV AG NPH QL: NEGATIVE
FLUBV AG NPH QL: NEGATIVE
SARS-COV-2 AG RESP QL IA.RAPID: NEGATIVE

## 2024-01-03 PROCEDURE — 1160F RVW MEDS BY RX/DR IN RCRD: CPT | Mod: ,,, | Performed by: FAMILY MEDICINE

## 2024-01-03 PROCEDURE — 96372 THER/PROPH/DIAG INJ SC/IM: CPT | Mod: ,,, | Performed by: FAMILY MEDICINE

## 2024-01-03 PROCEDURE — 3077F SYST BP >= 140 MM HG: CPT | Mod: ,,, | Performed by: FAMILY MEDICINE

## 2024-01-03 PROCEDURE — 87804 INFLUENZA ASSAY W/OPTIC: CPT | Mod: 59,QW,, | Performed by: FAMILY MEDICINE

## 2024-01-03 PROCEDURE — 3008F BODY MASS INDEX DOCD: CPT | Mod: ,,, | Performed by: FAMILY MEDICINE

## 2024-01-03 PROCEDURE — 3079F DIAST BP 80-89 MM HG: CPT | Mod: ,,, | Performed by: FAMILY MEDICINE

## 2024-01-03 PROCEDURE — 1159F MED LIST DOCD IN RCRD: CPT | Mod: ,,, | Performed by: FAMILY MEDICINE

## 2024-01-03 PROCEDURE — 87426 SARSCOV CORONAVIRUS AG IA: CPT | Mod: QW,,, | Performed by: FAMILY MEDICINE

## 2024-01-03 PROCEDURE — 99214 OFFICE O/P EST MOD 30 MIN: CPT | Mod: 25,,, | Performed by: FAMILY MEDICINE

## 2024-01-03 RX ORDER — BENZONATATE 100 MG/1
100 CAPSULE ORAL 3 TIMES DAILY PRN
Qty: 20 CAPSULE | Refills: 0 | Status: SHIPPED | OUTPATIENT
Start: 2024-01-03

## 2024-01-03 RX ORDER — PREDNISONE 20 MG/1
40 TABLET ORAL DAILY
Qty: 10 TABLET | Refills: 0 | Status: SHIPPED | OUTPATIENT
Start: 2024-01-03 | End: 2024-01-08

## 2024-01-03 RX ORDER — DEXAMETHASONE SODIUM PHOSPHATE 4 MG/ML
6 INJECTION, SOLUTION INTRA-ARTICULAR; INTRALESIONAL; INTRAMUSCULAR; INTRAVENOUS; SOFT TISSUE
Status: COMPLETED | OUTPATIENT
Start: 2024-01-03 | End: 2024-01-03

## 2024-01-03 RX ORDER — AZITHROMYCIN 250 MG/1
TABLET, FILM COATED ORAL
Qty: 6 TABLET | Refills: 0 | Status: SHIPPED | OUTPATIENT
Start: 2024-01-03

## 2024-01-03 RX ORDER — CEFTRIAXONE 500 MG/1
500 INJECTION, POWDER, FOR SOLUTION INTRAMUSCULAR; INTRAVENOUS
Status: COMPLETED | OUTPATIENT
Start: 2024-01-03 | End: 2024-01-03

## 2024-01-03 RX ADMIN — CEFTRIAXONE 500 MG: 500 INJECTION, POWDER, FOR SOLUTION INTRAMUSCULAR; INTRAVENOUS at 05:01

## 2024-01-03 RX ADMIN — DEXAMETHASONE SODIUM PHOSPHATE 6 MG: 4 INJECTION, SOLUTION INTRA-ARTICULAR; INTRALESIONAL; INTRAMUSCULAR; INTRAVENOUS; SOFT TISSUE at 05:01

## 2024-01-03 NOTE — PROGRESS NOTES
Subjective:       Patient ID: Eugenio Judge is a 58 y.o. male.    Chief Complaint: Cough    Cough  Associated symptoms include postnasal drip and rhinorrhea. Pertinent negatives include no chest pain, chills, ear pain, eye redness, fever, headaches, myalgias, rash, sore throat, shortness of breath or wheezing. There is no history of environmental allergies.     Review of Systems   Constitutional:  Negative for activity change, appetite change, chills, diaphoresis, fatigue, fever and unexpected weight change.   HENT:  Positive for nasal congestion, postnasal drip, rhinorrhea and sinus pressure/congestion. Negative for dental problem, drooling, ear discharge, ear pain, facial swelling, hearing loss, mouth sores, nosebleeds, sneezing, sore throat, tinnitus, trouble swallowing, voice change and goiter.    Eyes:  Negative for photophobia, pain, discharge, redness, itching and visual disturbance.   Respiratory:  Positive for cough. Negative for apnea, choking, chest tightness, shortness of breath, wheezing and stridor.    Cardiovascular:  Negative for chest pain, palpitations, leg swelling and claudication.   Gastrointestinal:  Negative for abdominal distention, abdominal pain, anal bleeding, blood in stool, change in bowel habit, constipation, diarrhea, nausea, vomiting, reflux and fecal incontinence.   Endocrine: Negative for cold intolerance, heat intolerance, polydipsia, polyphagia and polyuria.   Genitourinary:  Negative for bladder incontinence, decreased urine volume, difficulty urinating, discharge, dysuria, enuresis, erectile dysfunction, flank pain, frequency, genital sores, hematuria, penile pain, testicular pain and urgency.   Musculoskeletal:  Negative for arthralgias, back pain, gait problem, joint swelling, leg pain, myalgias, neck pain, neck stiffness and joint deformity.   Integumentary:  Negative for pallor, rash, wound and mole/lesion.   Allergic/Immunologic: Negative for environmental allergies,  food allergies and frequent infections.   Neurological:  Negative for dizziness, vertigo, tremors, seizures, syncope, facial asymmetry, speech difficulty, weakness, light-headedness, numbness, headaches, memory loss and coordination difficulties.   Hematological:  Negative for adenopathy. Does not bruise/bleed easily.   Psychiatric/Behavioral:  Negative for agitation, behavioral problems, confusion, decreased concentration, dysphoric mood, hallucinations, self-injury, sleep disturbance and suicidal ideas. The patient is not nervous/anxious and is not hyperactive.          Objective:      Physical Exam  Vitals reviewed.   Constitutional:       Appearance: Normal appearance. He is normal weight.   HENT:      Head: Normocephalic and atraumatic.      Right Ear: Tympanic membrane and ear canal normal.      Left Ear: Tympanic membrane, ear canal and external ear normal.      Nose: Congestion and rhinorrhea present.      Mouth/Throat:      Mouth: Mucous membranes are moist.      Pharynx: Oropharynx is clear. Posterior oropharyngeal erythema present.   Eyes:      Extraocular Movements: Extraocular movements intact.      Conjunctiva/sclera: Conjunctivae normal.      Pupils: Pupils are equal, round, and reactive to light.   Cardiovascular:      Rate and Rhythm: Normal rate and regular rhythm.      Pulses: Normal pulses.      Heart sounds: Normal heart sounds.   Pulmonary:      Effort: Pulmonary effort is normal.      Breath sounds: Normal breath sounds.   Abdominal:      General: Abdomen is flat. Bowel sounds are normal.      Palpations: Abdomen is soft.   Musculoskeletal:         General: Normal range of motion.      Cervical back: Normal range of motion and neck supple.   Skin:     General: Skin is warm and dry.   Neurological:      General: No focal deficit present.      Mental Status: He is alert and oriented to person, place, and time. Mental status is at baseline.   Psychiatric:         Mood and Affect: Mood normal.          Behavior: Behavior normal.         Thought Content: Thought content normal.         Judgment: Judgment normal.         Assessment:       1. Bronchitis    2. Exposure to viral disease        Plan:     Bronchitis  -     cefTRIAXone injection 500 mg  -     dexAMETHasone injection 6 mg  -     predniSONE (DELTASONE) 20 MG tablet; Take 2 tablets (40 mg total) by mouth once daily. for 5 days  Dispense: 10 tablet; Refill: 0  -     benzonatate (TESSALON) 100 MG capsule; Take 1 capsule (100 mg total) by mouth 3 (three) times daily as needed for Cough.  Dispense: 20 capsule; Refill: 0  -     azithromycin (Z-NEIL) 250 MG tablet; Take 2 tablets by mouth on day 1; Take 1 tablet by mouth on days 2-5  Dispense: 6 tablet; Refill: 0    Exposure to viral disease  -     POCT Influenza A/B Rapid Antigen  -     SARS Coronavirus 2 Antigen, POCT

## 2024-02-26 ENCOUNTER — TELEPHONE (OUTPATIENT)
Dept: ORTHOPEDICS | Facility: CLINIC | Age: 59
End: 2024-02-26
Payer: COMMERCIAL

## 2024-02-26 NOTE — TELEPHONE ENCOUNTER
----- Message from Shelli Heard sent at 2/26/2024 10:19 AM CST -----  Pt is an est pt. Said he saw Dr Kenyon few years ago. He wants to come in for left shoulder pain. Dr Valdivia did a rotator cuff repair several years ago. Wants an appt. [phone].

## 2024-02-27 DIAGNOSIS — M25.512 LEFT SHOULDER PAIN, UNSPECIFIED CHRONICITY: Primary | ICD-10-CM

## 2024-02-28 ENCOUNTER — HOSPITAL ENCOUNTER (OUTPATIENT)
Dept: RADIOLOGY | Facility: HOSPITAL | Age: 59
Discharge: HOME | End: 2024-02-28
Attending: ORTHOPAEDIC SURGERY
Payer: COMMERCIAL

## 2024-02-28 ENCOUNTER — OFFICE VISIT (OUTPATIENT)
Dept: ORTHOPEDICS | Facility: CLINIC | Age: 59
End: 2024-02-28
Payer: COMMERCIAL

## 2024-02-28 VITALS — HEIGHT: 66 IN | WEIGHT: 175 LBS | BODY MASS INDEX: 28.12 KG/M2

## 2024-02-28 DIAGNOSIS — M25.512 LEFT SHOULDER PAIN, UNSPECIFIED CHRONICITY: ICD-10-CM

## 2024-02-28 DIAGNOSIS — M25.512 LEFT SHOULDER PAIN, UNSPECIFIED CHRONICITY: Primary | ICD-10-CM

## 2024-02-28 PROCEDURE — 99999PBSHW PR PBB SHADOW TECHNICAL ONLY FILED TO HB: Mod: PBBFAC,,,

## 2024-02-28 PROCEDURE — 1159F MED LIST DOCD IN RCRD: CPT | Mod: ,,, | Performed by: ORTHOPAEDIC SURGERY

## 2024-02-28 PROCEDURE — 3008F BODY MASS INDEX DOCD: CPT | Mod: ,,, | Performed by: ORTHOPAEDIC SURGERY

## 2024-02-28 PROCEDURE — 99213 OFFICE O/P EST LOW 20 MIN: CPT | Mod: PBBFAC,25 | Performed by: ORTHOPAEDIC SURGERY

## 2024-02-28 PROCEDURE — 20610 DRAIN/INJ JOINT/BURSA W/O US: CPT | Mod: PBBFAC,LT | Performed by: ORTHOPAEDIC SURGERY

## 2024-02-28 PROCEDURE — 73030 X-RAY EXAM OF SHOULDER: CPT | Mod: TC,LT

## 2024-02-28 PROCEDURE — 99203 OFFICE O/P NEW LOW 30 MIN: CPT | Mod: S$PBB,25,, | Performed by: ORTHOPAEDIC SURGERY

## 2024-02-28 PROCEDURE — 73030 X-RAY EXAM OF SHOULDER: CPT | Mod: 26,LT,, | Performed by: ORTHOPAEDIC SURGERY

## 2024-02-28 RX ORDER — TRIAMCINOLONE ACETONIDE 40 MG/ML
40 INJECTION, SUSPENSION INTRA-ARTICULAR; INTRAMUSCULAR
Status: DISCONTINUED | OUTPATIENT
Start: 2024-02-28 | End: 2024-02-28 | Stop reason: HOSPADM

## 2024-02-28 RX ORDER — BUPIVACAINE HYDROCHLORIDE 2.5 MG/ML
1 INJECTION, SOLUTION EPIDURAL; INFILTRATION; INTRACAUDAL
Status: DISCONTINUED | OUTPATIENT
Start: 2024-02-28 | End: 2024-02-28 | Stop reason: HOSPADM

## 2024-02-28 RX ADMIN — BUPIVACAINE HYDROCHLORIDE 1 ML: 2.5 INJECTION, SOLUTION EPIDURAL; INFILTRATION; INTRACAUDAL at 03:02

## 2024-02-28 RX ADMIN — TRIAMCINOLONE ACETONIDE 40 MG: 40 INJECTION, SUSPENSION INTRA-ARTICULAR; INTRAMUSCULAR at 03:02

## 2024-02-28 NOTE — PROGRESS NOTES
Radiology Interpretation        Patient Name: Eugenio Judge  Date: 2/28/2024  YOB: 1965  MRN# [id number]        ORDERING DIAGNOSIS:    Encounter Diagnosis   Name Primary?    Left shoulder pain, unspecified chronicity Yes      Two views AP lateral left shoulder skeletally mature individual there is normal mineralization there are postsurgical changes distal clavicle no fractures noted impression postsurgical changes left clavicle                 Jorge Kenyon MD

## 2024-02-28 NOTE — PROCEDURES
Large Joint Aspiration/Injection: L subacromial bursa    Date/Time: 2/28/2024 3:50 PM    Performed by: Jorge Kenyon MD  Authorized by: Jorge Kenyon MD    Consent Done?:  Yes (Verbal)  Indications:  Pain    Details:  Needle Size:  22 G  Ultrasonic Guidance for needle placement?: No    Approach:  Posterior  Location:  Shoulder  Site:  L subacromial bursa  Medications:  1 mL BUPivacaine (PF) 0.25% (2.5 mg/ml) 0.25 % (2.5 mg/mL); 40 mg triamcinolone acetonide 40 mg/mL  Patient tolerance:  Patient tolerated the procedure well with no immediate complications

## 2024-02-28 NOTE — PROGRESS NOTES
Patient is here for left shoulder pain.  He has had previous arthroscopy.  It is x-rays show postsurgical changes no fractures minimal if any degenerative changes.  Has pain on impingement testing he actually has pain on external rotation but it has posteriorly not anteriorly in his shoulder.  Minimal pain on [place]wannee's testing negative sulcus test he does not have weakness on abduction forward flexion just soreness.  At this time I think he has some bursitis.  Having little impingement.  He is going to do strengthening exercises.  I injected his shoulder with 1 cc Marcaine 1 cc Kenalog.  Let him use his arm as tolerates.  I will follow this gentleman back up in 2-3 months.  He does move his fingers well has sensation to touch.  He has motor function 5/5 palpable pulses the wrist patient's past medical and surgical history was reviewed

## 2024-06-10 ENCOUNTER — OFFICE VISIT (OUTPATIENT)
Dept: ORTHOPEDICS | Facility: CLINIC | Age: 59
End: 2024-06-10
Payer: COMMERCIAL

## 2024-06-10 DIAGNOSIS — M25.512 LEFT SHOULDER PAIN, UNSPECIFIED CHRONICITY: ICD-10-CM

## 2024-06-10 DIAGNOSIS — M75.52 BURSITIS OF LEFT SHOULDER: Primary | ICD-10-CM

## 2024-06-10 PROCEDURE — 99999PBSHW PR PBB SHADOW TECHNICAL ONLY FILED TO HB: Mod: PBBFAC,,,

## 2024-06-10 PROCEDURE — 1159F MED LIST DOCD IN RCRD: CPT | Mod: ,,, | Performed by: ORTHOPAEDIC SURGERY

## 2024-06-10 PROCEDURE — 20610 DRAIN/INJ JOINT/BURSA W/O US: CPT | Mod: PBBFAC | Performed by: ORTHOPAEDIC SURGERY

## 2024-06-10 PROCEDURE — 99213 OFFICE O/P EST LOW 20 MIN: CPT | Mod: S$PBB,25,, | Performed by: ORTHOPAEDIC SURGERY

## 2024-06-10 PROCEDURE — 99213 OFFICE O/P EST LOW 20 MIN: CPT | Mod: PBBFAC | Performed by: ORTHOPAEDIC SURGERY

## 2024-06-10 RX ORDER — BUPIVACAINE HYDROCHLORIDE 2.5 MG/ML
1 INJECTION, SOLUTION EPIDURAL; INFILTRATION; INTRACAUDAL
Status: DISCONTINUED | OUTPATIENT
Start: 2024-06-10 | End: 2024-06-10 | Stop reason: HOSPADM

## 2024-06-10 RX ORDER — TRIAMCINOLONE ACETONIDE 40 MG/ML
40 INJECTION, SUSPENSION INTRA-ARTICULAR; INTRAMUSCULAR
Status: DISCONTINUED | OUTPATIENT
Start: 2024-06-10 | End: 2024-06-10 | Stop reason: HOSPADM

## 2024-06-10 RX ADMIN — TRIAMCINOLONE ACETONIDE 40 MG: 40 INJECTION, SUSPENSION INTRA-ARTICULAR; INTRAMUSCULAR at 03:06

## 2024-06-10 RX ADMIN — BUPIVACAINE HYDROCHLORIDE 1 ML: 2.5 INJECTION, SOLUTION EPIDURAL; INFILTRATION; INTRACAUDAL at 03:06

## 2024-06-10 NOTE — PROGRESS NOTES
Patient is here for left shoulder pain in his x-rays previously show he has some postsurgical changes.  He is having more symptoms over his scapula bursa but he is also having some pain over his anterolateral acromion.  No instability in the shoulder noted.  I will send him to therapy for strengthening and some modalities on the shoulder.  I did inject his shoulder with 1 cc of Marcaine 1 cc Kenalog.  He is going to go to physical therapy.  I will check him back in 2 months.

## 2024-06-10 NOTE — PROCEDURES
Large Joint Aspiration/Injection: L subacromial bursa    Date/Time: 6/10/2024 3:10 PM    Performed by: Jorge Kenyon MD  Authorized by: Jorge Kenyon MD    Consent Done?:  Yes (Verbal)  Indications:  Pain    Details:  Needle Size:  22 G  Ultrasonic Guidance for needle placement?: No    Approach:  Posterior  Location:  Shoulder  Site:  L subacromial bursa  Medications:  1 mL BUPivacaine (PF) 0.25% (2.5 mg/ml) 0.25 % (2.5 mg/mL); 40 mg triamcinolone acetonide 40 mg/mL  Patient tolerance:  Patient tolerated the procedure well with no immediate complications

## 2024-06-19 ENCOUNTER — CLINICAL SUPPORT (OUTPATIENT)
Dept: REHABILITATION | Facility: HOSPITAL | Age: 59
End: 2024-06-19
Payer: COMMERCIAL

## 2024-06-19 DIAGNOSIS — M75.52 BURSITIS OF LEFT SHOULDER: ICD-10-CM

## 2024-06-19 DIAGNOSIS — M25.512 LEFT SHOULDER PAIN, UNSPECIFIED CHRONICITY: ICD-10-CM

## 2024-06-19 DIAGNOSIS — M54.12 CERVICAL RADICULOPATHY: Primary | ICD-10-CM

## 2024-06-19 DIAGNOSIS — M62.81 MUSCLE WEAKNESS OF LEFT UPPER EXTREMITY: ICD-10-CM

## 2024-06-19 PROCEDURE — 97110 THERAPEUTIC EXERCISES: CPT

## 2024-06-19 PROCEDURE — 97162 PT EVAL MOD COMPLEX 30 MIN: CPT

## 2024-06-19 NOTE — PLAN OF CARE
[facility]   Physical Therapy Initial Evaluation      Name: Eugenio Judge  Clinic Number: [id number]    Therapy Diagnosis:   Encounter Diagnoses   Name Primary?    Left shoulder pain, unspecified chronicity     Bursitis of left shoulder         Physician: Jorge Kenyon MD    Physician Orders: PT Eval and Treat   Medical Diagnosis from Referral: see above  Evaluation Date: 6/19/2024  Authorization Period Expiration: 6/10/2025  Plan of Care Expiration: 8/16/2024    Date of Surgery: n/a  Visit # / Visits authorized: 1/ 60 - HARD MAX  FOTO: 1/ 3    Precautions: Standard     Time In: 4:47 pm  Time Out: 5:24 pm  Total Billable Time: 37 minutes    Subjective     Date of onset: Jan-Feb 2024    History of current condition - Eugenio reports: no injury to note - started hurting a few months ago - rides and races dirt bikes - h/o left shoulder surgery years ago - has had 2 injections - the second one has helped a lot - says it is hard to describe how the shoulder felt - it was not an excruciating pain but it would feel heavy or like it was going to go numb - has never been treated for any cervical issues - he did recreate his symptoms with lateral flexion and rotation to the left    Falls: n/a    Imaging: xray: minimal degenerative changes present - post surgical changes observed per report    Prior Therapy: none  Social History:  lives alone  Occupation: aircraft   Prior Level of Function: independent   Current Level of Function: independent     Pain:  Current 0/10, worst 0/10, best 0/10   Location: left shoulder    Description: Grabbing, Deep, Numb, and Variable  Aggravating Factors: extension, rotation, and lateral flexion to the left  Easing Factors: n/a    Patients goals: get rid of numb/heavy feeling in left shoulder     Medical History:   Past Medical History:   Diagnosis Date    Kidney stone        Surgical History:   Eugenio Judge  has a past surgical history that includes  Shoulder surgery; Colon surgery; Appendectomy; Colon surgery; Knee surgery; Elbow surgery; and Shoulder surgery.    Medications:   Eugenio has a current medication list which includes the following prescription(s): amlodipine, amoxicillin, azithromycin, benzonatate, and sildenafil.    Allergies:   Review of patient's allergies indicates:   Allergen Reactions    Covid-19 vac, bv (pfizer)(pf) Swelling, Other (See Comments) and Blisters     [id number] and [id number]  Fever    Grass pollen      Note: - [employer] 06/12/2018    Adhesive tape-silicones Rash        Objective      Posture: rounded shoulders Yes, forward head Yes, increased kyphotic curve No, decreased lordotic curve No  Clear cervical spine: Spurling's test positive    Range of motion  Motion Right  Left    Shoulder flexion WITHIN FUNCTIONAL LIMITS WITHIN FUNCTIONAL LIMITS   Shoulder extension WITHIN FUNCTIONAL LIMITS WITHIN FUNCTIONAL LIMITS   Shoulder abduction WITHIN FUNCTIONAL LIMITS WITHIN FUNCTIONAL LIMITS   Shoulder internal rotation WITHIN FUNCTIONAL LIMITS WITHIN FUNCTIONAL LIMITS   Shoulder external rotation WITHIN FUNCTIONAL LIMITS WITHIN FUNCTIONAL LIMITS   Elbow flexion WITHIN FUNCTIONAL LIMITS WITHIN FUNCTIONAL LIMITS   Elbow extension WITHIN FUNCTIONAL LIMITS WITHIN FUNCTIONAL LIMITS     Cervical range of motion within functional limits except 25% limited with lateral flexion to the right    MANUAL MUSCLE TEST  Muscle Right  Left    Shoulder flexion MMT strength: 5/5 MMT strength: 5/5   Shoulder extension MMT strength: 5/5 MMT strength: 5/5   Shoulder abduction MMT strength: 4+/5 MMT strength: 4+/5   Shoulder internal rotation MMT strength: 5/5 MMT strength: 5/5   Shoulder external rotation MMT strength: 4+/5 MMT strength: 4+/5   Elbow flexion MMT strength: 5/5 MMT strength: 5/5   Elbow extension MMT strength: 5/5 MMT strength: 5/5     Functional ability:  Dressing: AMB PT LEVEL OF ASSISTANCE: independent  Driving: AMB PT LEVEL OF ASSISTANCE:  "independent  Overhead activity: AMB PT LEVEL OF ASSISTANCE: independent  Work/hobbies: AMB PT LEVEL OF ASSISTANCE: independent      Clinical test:  Apprehension test: negative  Neer's test: negative  O'jazzmine's test: negative  Drop arm test: negative  Empty can test: negative  Hawkin's elaine test: negative    - felt a little "something" at end range of abduction   - felt the "grabbing sensation" with cervical rotation, extension and lateral flexion to left    Palpation: mild tenderness in bicipital groove    Intake Outcome Measure for FOTO Shoulder Survey    Therapist reviewed FOTO scores for Eugenio Judge on 6/19/2024.   FOTO report - see Media section or FOTO account episode details.    Intake Score: 84         Treatment     Total Treatment time (time-based codes) separate from Evaluation: 10 minutes     Eugenio received the treatments listed below:      Corner stretch, scapular retraction/extension w/ band, left upper trap stretch, chin tuck with cervical retraction in supine    Patient Education and Home Exercises     Education provided:   - evaluation results, plan of care and home exercise program     Written Home Exercises Provided: yes. Exercises were reviewed and Eugenio was able to demonstrate them prior to the end of the session.  Eugenio demonstrated good  understanding of the education provided. See EMR under Patient Instructions for exercises provided during therapy sessions.    Assessment     Eugenio is a 58 y.o. male referred to outpatient Physical Therapy with a medical diagnosis of left shoulder pain/bursitis. Patient presents with complaints of left shoulder feeling like it is going numb or dead with certain movements. He says it is not really a pain - kind of hard to describe the sensation. He did say that the second injection he had has really helped the symptoms. He has full range of motion of left shoulder with only mild twinge at end range of abduction. He did not have any pain with any shoulder special " tests and has good strength throughout the left upper extremity. He is slightly limited with cervical lateral flexion to the right. He did have a positive Spurling's test on the left. Will work on scapular strengthening to improve posture and utilize cervical traction to address radicular symptoms.     Patient prognosis is Good.   Patient will benefit from skilled outpatient Physical Therapy to address the deficits stated above and in the chart below, provide patient /family education, and to maximize patientt's level of independence.     Plan of care discussed with patient: Yes  Patient's spiritual, cultural and educational needs considered and patient is agreeable to the plan of care and goals as stated below:     Anticipated Barriers for therapy: none    Medical Necessity is demonstrated by the following  History  Co-morbidities and personal factors that may impact the plan of care [x] LOW: no personal factors / co-morbidities  [] MODERATE: 1-2 personal factors / co-morbidities  [] HIGH: 3+ personal factors / co-morbidities    Moderate / High Support Documentation:   Co-morbidities affecting plan of care:   Past Medical History:   Diagnosis Date    Kidney stone    Surgical History:   Eugenio Judge  has a past surgical history that includes Shoulder surgery; Colon surgery; Appendectomy; Colon surgery; Knee surgery; Elbow surgery; and Shoulder surgery.    Personal Factors:   no deficits     Examination  Body Structures and Functions, activity limitations and participation restrictions that may impact the plan of care [] LOW: addressing 1-2 elements  [x] MODERATE: 3+ elements  [] HIGH: 4+ elements (please support below)    Moderate / High Support Documentation: left shoulder dysfunction, numbness/tingling left upper extremity, cervical radiculopathy     Clinical Presentation [] LOW: stable  [x] MODERATE: Evolving  [] HIGH: Unstable     Decision Making/ Complexity Score: moderate         Goals:   Patient will be  independent with home exercise program to facilitate carryover between visits.  Patient will have active range of motion of left shoulder in all directions without heavy/numb feeling in left upper extremity.  Patient will have 5/5 strength left shoulder/upper extremity to perform household chores and work related tasks.  Patient will place 5# dumbbell on head height shelf without hiking or pain left shoulder.  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     Plan     Plan of care Certification: 6/19/2024 to 8/16/2024.    Outpatient Physical Therapy 2 times weekly for 8 weeks to include the following interventions: Cervical/Lumbar Traction, Electrical Stimulation IFC/hivolt/premod as needed, Manual Therapy, Moist Heat/ Ice, Neuromuscular Re-ed, Patient Education, Therapeutic Activities, and Therapeutic Exercise.     JUAN F ROYAL PT        Physician's Signature: _________________________________________ Date: ________________

## 2024-06-24 PROBLEM — M54.12 CERVICAL RADICULOPATHY: Status: ACTIVE | Noted: 2024-06-24

## 2024-06-24 PROBLEM — M62.81 MUSCLE WEAKNESS OF LEFT UPPER EXTREMITY: Status: ACTIVE | Noted: 2024-06-24

## 2024-06-24 PROBLEM — M25.512 LEFT SHOULDER PAIN: Status: ACTIVE | Noted: 2024-06-24

## 2024-06-25 ENCOUNTER — CLINICAL SUPPORT (OUTPATIENT)
Dept: REHABILITATION | Facility: HOSPITAL | Age: 59
End: 2024-06-25
Payer: COMMERCIAL

## 2024-06-25 DIAGNOSIS — M62.81 MUSCLE WEAKNESS OF LEFT UPPER EXTREMITY: ICD-10-CM

## 2024-06-25 DIAGNOSIS — M25.512 ACUTE PAIN OF LEFT SHOULDER: ICD-10-CM

## 2024-06-25 DIAGNOSIS — M54.12 CERVICAL RADICULOPATHY: Primary | ICD-10-CM

## 2024-06-25 PROCEDURE — 97112 NEUROMUSCULAR REEDUCATION: CPT | Mod: CQ

## 2024-06-25 PROCEDURE — 97012 MECHANICAL TRACTION THERAPY: CPT | Mod: CQ

## 2024-06-25 PROCEDURE — 97110 THERAPEUTIC EXERCISES: CPT | Mod: CQ

## 2024-06-25 NOTE — PROGRESS NOTES
"[facility]   Physical Therapy Treatment Note      Name: Eugenio Judge  Clinic Number: [id number]    Therapy Diagnosis:   Encounter Diagnoses   Name Primary?    Cervical radiculopathy Yes    Acute pain of left shoulder     Muscle weakness of left upper extremity      Physician: Jorge Kenyon MD    Visit Date: 6/25/2024    Physician Orders: PT Eval and Treat   Medical Diagnosis from Referral: see above  Evaluation Date: 6/19/2024  Authorization Period Expiration: 6/10/2025  Plan of Care Expiration: 8/16/2024     Date of Surgery: n/a  Visit # / Visits authorized: 2/ 60 - HARD MAX  FOTO: 1/ 3     Precautions: Standard  PTA Visit #: 1/5     Time In: 3:57 pm  Time Out: 4:55 pm  Total Billable Time: 58 minutes    Subjective     Pt reports: "A weird" feeling in L shoulder and neck.  He was compliant with home exercise program.  Response to previous treatment: no complaints   Functional change: no change noted    Pain: 0/10  Location: left shoulder  and neck    Objective      Objective Measures updated at progress report unless specified.     Treatment     Eugenio received the treatments listed below:      therapeutic exercises to develop strength, endurance, ROM, flexibility, posture, and core stabilization for  28 minutes including:  Corner stretch--3 x 30 sec  Left upper trap stretch--3 x 30 sec  chin tuck with cervical retraction in T-Drill--3 x 10  T- Drill--3 min  UBE x 3 min  Soldiers--2 min  Wall angels--3 x 10    manual therapy techniques: Joint mobilizations, Manual traction, Myofacial release, Soft tissue Mobilization, and Friction Massage were applied to the: cervical spine for 0 minutes, including:  -    neuromuscular re-education activities to improve: Balance, Coordination, Kinesthetic Sense, Proprioception, and Posture for 15 minutes. The following activities were included:  scapular retraction/extension w/ chin tuck--blue band--3 x 10  Prone retraction with ext--10 x 10 " "sec  Prone 90/90 lift--10 x 10 sec    therapeutic activities to improve functional performance for 0  minutes, including:  -    supervised modalities after being cleared for contradictions: Mechanical Traction:  Eugenio received static mechanical traction to the cervical spine at a force of 25 pounds for a total of 15 minutes. Hold time of 45 seconds and rest time for 15  Seconds. Patient tolerated treatment well without any adverse effects.    Patient Education and Home Exercises       Education provided:   - review of home exercise program and current Plan of Care/rationale of treatment.    Written Home Exercises Provided: Patient instructed to cont prior HEP. Exercises were reviewed and Eugenio was able to demonstrate them prior to the end of the session.  Eugenio demonstrated good understanding of the education provided. See EMR under Patient Instructions for exercises provided during therapy sessions    Assessment     At Evaluation:  Eugenio is a 58 y.o. male referred to outpatient Physical Therapy with a medical diagnosis of left shoulder pain/bursitis. Patient presents with complaints of left shoulder feeling like it is going numb or dead with certain movements. He says it is not really a pain - kind of hard to describe the sensation. He did say that the second injection he had has really helped the symptoms. He has full range of motion of left shoulder with only mild twinge at end range of abduction. He did not have any pain with any shoulder special tests and has good strength throughout the left upper extremity. He is slightly limited with cervical lateral flexion to the right. He did have a positive Spurling's test on the left. Will work on scapular strengthening to improve posture and utilize cervical traction to address radicular symptoms.     Current Assessment:  Patient arrived with reports of "Weird feeling" in L upper trap and shoulder...  Did no describe as pain.  Started with UBE.  Session focused on postural " correction and periscapular strengthening.  Did do cervical traction today.  Will see if this helped.   Cont POC    Eugenio Is progressing towards his goals.   Pt prognosis is Good.     Pt will continue to benefit from skilled outpatient physical therapy to address the deficits listed in the problem list box on initial evaluation, provide pt/family education and to maximize pt's level of independence in the home and community environment.     Pt's spiritual, cultural and educational needs considered and pt agreeable to plan of care and goals.     Anticipated barriers to physical therapy: none    Goals:   Patient will be independent with home exercise program to facilitate carryover between visits.  Patient will have active range of motion of left shoulder in all directions without heavy/numb feeling in left upper extremity.  Patient will have 5/5 strength left shoulder/upper extremity to perform household chores and work related tasks.  Patient will place 5# dumbbell on head height shelf without hiking or pain left shoulder.  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        Plan     Plan of care Certification: 6/19/2024 to 8/16/2024.     Outpatient Physical Therapy 2 times weekly for 8 weeks to include the following interventions: Cervical/Lumbar Traction, Electrical Stimulation IFC/hivolt/premod as needed, Manual Therapy, Moist Heat/ Ice, Neuromuscular Re-ed, Patient Education, Therapeutic Activities, and Therapeutic Exercise.     Lalo Brannon, PTA   06/25/2024

## 2024-06-27 ENCOUNTER — CLINICAL SUPPORT (OUTPATIENT)
Dept: REHABILITATION | Facility: HOSPITAL | Age: 59
End: 2024-06-27
Payer: COMMERCIAL

## 2024-06-27 DIAGNOSIS — M54.12 CERVICAL RADICULOPATHY: Primary | ICD-10-CM

## 2024-06-27 DIAGNOSIS — M62.81 MUSCLE WEAKNESS OF LEFT UPPER EXTREMITY: ICD-10-CM

## 2024-06-27 PROCEDURE — 97140 MANUAL THERAPY 1/> REGIONS: CPT | Mod: CQ

## 2024-06-27 PROCEDURE — 97110 THERAPEUTIC EXERCISES: CPT | Mod: CQ

## 2024-06-27 PROCEDURE — 97112 NEUROMUSCULAR REEDUCATION: CPT | Mod: CQ

## 2024-06-27 NOTE — PROGRESS NOTES
[facility]   Physical Therapy Treatment Note      Name: Eugenio Judge  Clinic Number: [id number]    Therapy Diagnosis:   Encounter Diagnoses   Name Primary?    Cervical radiculopathy Yes    Muscle weakness of left upper extremity      Physician: Jorge Kenyon MD    Visit Date: 6/27/2024    Physician Orders: PT Eval and Treat   Medical Diagnosis from Referral: see above  Evaluation Date: 6/19/2024  Authorization Period Expiration: 6/10/2025  Plan of Care Expiration: 8/16/2024     Date of Surgery: n/a  Visit # / Visits authorized: 3 / 60 - HARD MAX  FOTO: 1/ 3     Precautions: Standard  PTA Visit #: 2/5     Time In: 4:01 pm  Time Out: 4:41 pm  Total Billable Time: 40 minutes    Subjective     Pt reports: no change in tingling in L neck and shoulder  He was compliant with home exercise program.  Response to previous treatment: no complaints   Functional change: no change noted    Pain: 0/10  Location: left shoulder  and neck    Objective      Objective Measures updated at progress report unless specified.     Treatment     Eugenio received the treatments listed below:      therapeutic exercises to develop strength, endurance, ROM, flexibility, posture, and core stabilization for  8 minutes including:  Corner stretch--3 x 30 sec  chin tuck with cervical retraction in T-Drill--3 x 10  T- Drill--3 min  UBE x 3 min    manual therapy techniques: Joint mobilizations, Manual traction, Myofacial release, Soft tissue Mobilization, and Friction Massage were applied to the: cervical spine for 8 minutes, including:  Manual traction  UT/SCM Stretch (L)    neuromuscular re-education activities to improve: Balance, Coordination, Kinesthetic Sense, Proprioception, and Posture for 23 minutes. The following activities were included:  scapular retraction/extension w/ chin tuck--blue band--3 x 10  Prone retraction with ext--10 x 10 sec  Prone 90/90 lift--10 x 10 sec  Face Pulls, blue band  20x  Retraction/Extension - green band 30x  Bilateral ER with sustained scap retract - 20x5 sec hold    therapeutic activities to improve functional performance for 0  minutes, including:  -    supervised modalities after being cleared for contradictions: Mechanical Traction:  Eugenio received static mechanical traction to the cervical spine at a force of 25 pounds for a total of 0 minutes. Hold time of 45 seconds and rest time for 15  Seconds. Patient tolerated treatment well without any adverse effects.    Patient Education and Home Exercises       Education provided:   - review of home exercise program and current Plan of Care/rationale of treatment.    Written Home Exercises Provided: Patient instructed to cont prior HEP. Exercises were reviewed and Eugenio was able to demonstrate them prior to the end of the session.  Eugenio demonstrated good understanding of the education provided. See EMR under Patient Instructions for exercises provided during therapy sessions    Assessment     At Evaluation:  Eugenio is a 58 y.o. male referred to outpatient Physical Therapy with a medical diagnosis of left shoulder pain/bursitis. Patient presents with complaints of left shoulder feeling like it is going numb or dead with certain movements. He says it is not really a pain - kind of hard to describe the sensation. He did say that the second injection he had has really helped the symptoms. He has full range of motion of left shoulder with only mild twinge at end range of abduction. He did not have any pain with any shoulder special tests and has good strength throughout the left upper extremity. He is slightly limited with cervical lateral flexion to the right. He did have a positive Spurling's test on the left. Will work on scapular strengthening to improve posture and utilize cervical traction to address radicular symptoms.     Current Assessment:  Not able to replicate pain/tingling in L neck/shoulder. A little tight in UT/SCM so focused  manuals there. Progressed postural strengthening exercises with fatigue noted. Educated pt to be diligent with his HEP. Will progress as able. Deferred mechanical traction due to no effect from previous tx.     Eugenio Is progressing towards his goals.   Pt prognosis is Good.     Pt will continue to benefit from skilled outpatient physical therapy to address the deficits listed in the problem list box on initial evaluation, provide pt/family education and to maximize pt's level of independence in the home and community environment.     Pt's spiritual, cultural and educational needs considered and pt agreeable to plan of care and goals.     Anticipated barriers to physical therapy: none    Goals:   Patient will be independent with home exercise program to facilitate carryover between visits.  Patient will have active range of motion of left shoulder in all directions without heavy/numb feeling in left upper extremity.  Patient will have 5/5 strength left shoulder/upper extremity to perform household chores and work related tasks.  Patient will place 5# dumbbell on head height shelf without hiking or pain left shoulder.  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        Plan     Plan of care Certification: 6/19/2024 to 8/16/2024.     Outpatient Physical Therapy 2 times weekly for 8 weeks to include the following interventions: Cervical/Lumbar Traction, Electrical Stimulation IFC/hivolt/premod as needed, Manual Therapy, Moist Heat/ Ice, Neuromuscular Re-ed, Patient Education, Therapeutic Activities, and Therapeutic Exercise.     Brice Hoskins, PTA   06/27/2024

## 2024-07-01 ENCOUNTER — CLINICAL SUPPORT (OUTPATIENT)
Dept: REHABILITATION | Facility: HOSPITAL | Age: 59
End: 2024-07-01
Payer: COMMERCIAL

## 2024-07-01 DIAGNOSIS — M62.81 MUSCLE WEAKNESS OF LEFT UPPER EXTREMITY: ICD-10-CM

## 2024-07-01 DIAGNOSIS — M54.12 CERVICAL RADICULOPATHY: Primary | ICD-10-CM

## 2024-07-01 PROCEDURE — 97110 THERAPEUTIC EXERCISES: CPT | Mod: CQ

## 2024-07-01 PROCEDURE — 97140 MANUAL THERAPY 1/> REGIONS: CPT | Mod: CQ

## 2024-07-01 PROCEDURE — 97112 NEUROMUSCULAR REEDUCATION: CPT | Mod: CQ

## 2024-07-01 PROCEDURE — 97012 MECHANICAL TRACTION THERAPY: CPT | Mod: CQ

## 2024-07-01 NOTE — PROGRESS NOTES
[facility]   Physical Therapy Treatment Note      Name: Eugenio Judge  Clinic Number: [id number]    Therapy Diagnosis:   Encounter Diagnoses   Name Primary?    Cervical radiculopathy Yes    Muscle weakness of left upper extremity      Physician: Jorge Kenyon MD    Visit Date: 7/1/2024    Physician Orders: PT Eval and Treat   Medical Diagnosis from Referral: see above  Evaluation Date: 6/19/2024  Authorization Period Expiration: 6/10/2025  Plan of Care Expiration: 8/16/2024     Date of Surgery: n/a  Visit # / Visits authorized: 4 / 60 - HARD MAX  FOTO: 1/ 3 = 84     Precautions: Standard  PTA Visit #: 3/5     Time In: 4:47 pm  Time Out: 5:41 pm  Total Billable Time: 40 minutes    Subjective     Pt reports: still no change in tingling in L neck and shoulder  He was compliant with home exercise program.  Response to previous treatment: no complaints   Functional change: no change noted    Pain: 0/10  Location: left shoulder  and neck    Objective      Objective Measures updated at progress report unless specified.   Case conference with Yvonne Cruz PT, for initial PTA visit.     Treatment     Eugenio received the treatments listed below:      therapeutic exercises to develop strength, endurance, ROM, flexibility, posture, and core stabilization for  12 minutes including:  UBE x 5 minutes   Corner stretch--3 x 30 seconds   chin tuck with cervical retraction in T-Drill--10 x 10 second hold     manual therapy techniques: Joint mobilizations, Manual traction, Myofacial release, Soft tissue Mobilization, and Friction Massage were applied to the: cervical spine for 8 minutes, including:  Manual traction  UT/SCM Stretch (L)    neuromuscular re-education activities to improve: Balance, Coordination, Kinesthetic Sense, Proprioception, and Posture for 8 minutes. The following activities were included:  scapular retraction/extension w/ chin tuck--blue band--3 x 10  Prone retraction with  ext  Prone 90/90 lift  Face Pulls, blue band   Bilateral ER with sustained scap retract   Cybex rows 3 plates 3 x 10    therapeutic activities to improve functional performance for 0  minutes, including:  -    supervised modalities after being cleared for contradictions: Mechanical Traction:  Eugenio received static mechanical traction to the cervical spine at a force of 25 pounds for a total of 15 minutes. Hold time of 45 seconds and rest time for 15  Seconds. Patient tolerated treatment well without any adverse effects.    Patient Education and Home Exercises       Education provided:   - review of home exercise program and current Plan of Care/rationale of treatment.    Written Home Exercises Provided: Patient instructed to cont prior HEP. Exercises were reviewed and Eugenio was able to demonstrate them prior to the end of the session.  Eugenio demonstrated good understanding of the education provided. See EMR under Patient Instructions for exercises provided during therapy sessions    Assessment     At Evaluation:  Eugenio is a 58 y.o. male referred to outpatient Physical Therapy with a medical diagnosis of left shoulder pain/bursitis. Patient presents with complaints of left shoulder feeling like it is going numb or dead with certain movements. He says it is not really a pain - kind of hard to describe the sensation. He did say that the second injection he had has really helped the symptoms. He has full range of motion of left shoulder with only mild twinge at end range of abduction. He did not have any pain with any shoulder special tests and has good strength throughout the left upper extremity. He is slightly limited with cervical lateral flexion to the right. He did have a positive Spurling's test on the left. Will work on scapular strengthening to improve posture and utilize cervical traction to address radicular symptoms.     Current Assessment:  Eugenio did not have much tightness noted in cervical muscles today. He  continues to have numbness and tingling in his hand intermittently. He performed some exercises to increase scapular awareness and strengthen parascapular muscle to improve posture. Ended with mechanical traction to attempt to decrease numbness and tingling episodes.    Eugenio Is progressing towards his goals.   Pt prognosis is Good.     Pt will continue to benefit from skilled outpatient physical therapy to address the deficits listed in the problem list box on initial evaluation, provide pt/family education and to maximize pt's level of independence in the home and community environment.     Pt's spiritual, cultural and educational needs considered and pt agreeable to plan of care and goals.     Anticipated barriers to physical therapy: none    Goals:   Patient will be independent with home exercise program to facilitate carryover between visits.  Patient will have active range of motion of left shoulder in all directions without heavy/numb feeling in left upper extremity.  Patient will have 5/5 strength left shoulder/upper extremity to perform household chores and work related tasks.  Patient will place 5# dumbbell on head height shelf without hiking or pain left shoulder.  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        Plan     Plan of care Certification: 6/19/2024 to 8/16/2024.     Outpatient Physical Therapy 2 times weekly for 8 weeks to include the following interventions: Cervical/Lumbar Traction, Electrical Stimulation IFC/hivolt/premod as needed, Manual Therapy, Moist Heat/ Ice, Neuromuscular Re-ed, Patient Education, Therapeutic Activities, and Therapeutic Exercise.     Fidelina Briscoe, PTA   07/01/2024

## 2024-07-11 ENCOUNTER — CLINICAL SUPPORT (OUTPATIENT)
Dept: REHABILITATION | Facility: HOSPITAL | Age: 59
End: 2024-07-11
Payer: COMMERCIAL

## 2024-07-11 DIAGNOSIS — M54.12 CERVICAL RADICULOPATHY: Primary | ICD-10-CM

## 2024-07-11 DIAGNOSIS — M25.512 ACUTE PAIN OF LEFT SHOULDER: ICD-10-CM

## 2024-07-11 DIAGNOSIS — M62.81 MUSCLE WEAKNESS OF LEFT UPPER EXTREMITY: ICD-10-CM

## 2024-07-11 PROCEDURE — 97112 NEUROMUSCULAR REEDUCATION: CPT | Mod: CQ

## 2024-07-11 PROCEDURE — 97110 THERAPEUTIC EXERCISES: CPT | Mod: CQ

## 2024-07-11 NOTE — PROGRESS NOTES
[facility]   Physical Therapy Treatment Note      Name: Eugenio Judge  Clinic Number: [id number]    Therapy Diagnosis:   Encounter Diagnoses   Name Primary?    Cervical radiculopathy Yes    Acute pain of left shoulder     Muscle weakness of left upper extremity      Physician: Jorge Kenyon MD    Visit Date: 7/11/2024    Physician Orders: PT Eval and Treat   Medical Diagnosis from Referral: see above  Evaluation Date: 6/19/2024  Authorization Period Expiration: 6/10/2025  Plan of Care Expiration: 8/16/2024     Date of Surgery: n/a  Visit # / Visits authorized: 5 / 60 - HARD MAX  FOTO: 1/ 3 = 84     Precautions: Standard  PTA Visit #: 4/5     Time In: 4:00 pm  Time Out:  4:45 pm  Total Billable Time:  45 minutes    Subjective     Pt reports: Has not had any N/T since Friday.  Rode his dirt bike Saturday without issue  He was compliant with home exercise program.  Response to previous treatment: no complaints   Functional change: no change noted    Pain: 0/10  Location: left shoulder  and neck    Objective      Range of motion  Motion Right  Left    Shoulder flexion WITHIN FUNCTIONAL LIMITS WITHIN FUNCTIONAL LIMITS   Shoulder extension WITHIN FUNCTIONAL LIMITS WITHIN FUNCTIONAL LIMITS   Shoulder abduction WITHIN FUNCTIONAL LIMITS WITHIN FUNCTIONAL LIMITS   Shoulder internal rotation WITHIN FUNCTIONAL LIMITS WITHIN FUNCTIONAL LIMITS   Shoulder external rotation WITHIN FUNCTIONAL LIMITS WITHIN FUNCTIONAL LIMITS   Elbow flexion WITHIN FUNCTIONAL LIMITS WITHIN FUNCTIONAL LIMITS   Elbow extension WITHIN FUNCTIONAL LIMITS WITHIN FUNCTIONAL LIMITS      Cervical range of motion within functional limits except 25% limited with lateral flexion to the right     MANUAL MUSCLE TEST  Muscle Right  Left    Shoulder flexion MMT strength: 5/5 MMT strength: 5/5   Shoulder extension MMT strength: 5/5 MMT strength: 5/5   Shoulder abduction MMT strength: 5/5 MMT strength: 5/5   Shoulder internal  rotation MMT strength: 5/5 MMT strength: 5/5   Shoulder external rotation MMT strength: 4+/5 MMT strength: 4+/5   Elbow flexion MMT strength: 5/5 MMT strength: 5/5   Elbow extension MMT strength: 5/5 MMT strength: 5/5      Functional ability:  Dressing: independent  Driving:  independent  Overhead activity:  independent  Work/hobbies:  independent       Treatment     Eugenio received the treatments listed below:      therapeutic exercises to develop strength, endurance, ROM, flexibility, posture, and core stabilization for  12 minutes including:  UBE x 5 minutes   Corner stretch--3 x 30 seconds   chin tuck with cervical retraction in T-Drill--10 x 10 second hold--on blue bolster today    manual therapy techniques: Joint mobilizations, Manual traction, Myofacial release, Soft tissue Mobilization, and Friction Massage were applied to the: cervical spine for 0 minutes, including:  -    (Not Today)  Manual traction  UT/SCM Stretch (L)    neuromuscular re-education activities to improve: Balance, Coordination, Kinesthetic Sense, Proprioception, and Posture for 33 minutes. The following activities were included:  scapular retraction/extension w/ chin tuck--blue band--3 x 10  Prone retraction with ext--10 x 10 sec--2#  Prone 90/90 lift--10 x 10 sec--2#  Face Pulls, blue band --3 x 10  Bilateral ER with sustained scap retract--Green--3  x 10  Independence and arrows--Green band--3 x 10    (Not Today)  Cybex rows 3 plates 3 x 10    therapeutic activities to improve functional performance for 0  minutes, including:  -    supervised modalities after being cleared for contradictions: Mechanical Traction:  Eugenio received static mechanical traction to the cervical spine at a force of 25 pounds for a total of 0 minutes. Hold time of 45 seconds and rest time for 15  Seconds. Patient tolerated treatment well without any adverse effects.    Patient Education and Home Exercises       Education provided:   - review of home exercise program and  current Plan of Care/rationale of treatment.    Written Home Exercises Provided: Patient instructed to cont prior HEP. Exercises were reviewed and Eugenio was able to demonstrate them prior to the end of the session.  Eugenio demonstrated good understanding of the education provided. See EMR under Patient Instructions for exercises provided during therapy sessions    Assessment     At Evaluation:  Eugenio is a 58 y.o. male referred to outpatient Physical Therapy with a medical diagnosis of left shoulder pain/bursitis. Patient presents with complaints of left shoulder feeling like it is going numb or dead with certain movements. He says it is not really a pain - kind of hard to describe the sensation. He did say that the second injection he had has really helped the symptoms. He has full range of motion of left shoulder with only mild twinge at end range of abduction. He did not have any pain with any shoulder special tests and has good strength throughout the left upper extremity. He is slightly limited with cervical lateral flexion to the right. He did have a positive Spurling's test on the left. Will work on scapular strengthening to improve posture and utilize cervical traction to address radicular symptoms.     Current Assessment:  Eugenio did not have much tightness noted in cervical muscles today. He has not had any N/T or radicular symptoms since Friday.  He performed some exercises to increase scapular awareness and strengthen parascapular muscle to improve posture. Did not do traction today.  Will DC next week if symptoms do not return.  Cont POC    Eugenio Is progressing towards his goals.   Pt prognosis is Good.     Pt will continue to benefit from skilled outpatient physical therapy to address the deficits listed in the problem list box on initial evaluation, provide pt/family education and to maximize pt's level of independence in the home and community environment.     Pt's spiritual, cultural and educational needs  considered and pt agreeable to plan of care and goals.     Anticipated barriers to physical therapy: none    Goals:    Patient will be independent with home exercise program to facilitate carryover between visits.  Patient will have active range of motion of left shoulder in all directions without heavy/numb feeling in left upper extremity.  Patient will have 5/5 strength left shoulder/upper extremity to perform household chores and work related tasks.  Patient will place 5# dumbbell on head height shelf without hiking or pain left shoulder.  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        Plan     Plan of care Certification: 6/19/2024 to 8/16/2024.     Outpatient Physical Therapy 2 times weekly for 8 weeks to include the following interventions: Cervical/Lumbar Traction, Electrical Stimulation IFC/hivolt/premod as needed, Manual Therapy, Moist Heat/ Ice, Neuromuscular Re-ed, Patient Education, Therapeutic Activities, and Therapeutic Exercise.     Lalo Brannon, PTA   07/11/2024

## 2024-07-15 ENCOUNTER — CLINICAL SUPPORT (OUTPATIENT)
Dept: REHABILITATION | Facility: HOSPITAL | Age: 59
End: 2024-07-15
Payer: COMMERCIAL

## 2024-07-15 DIAGNOSIS — M62.81 MUSCLE WEAKNESS OF LEFT UPPER EXTREMITY: ICD-10-CM

## 2024-07-15 DIAGNOSIS — M54.12 CERVICAL RADICULOPATHY: Primary | ICD-10-CM

## 2024-07-15 DIAGNOSIS — M25.512 ACUTE PAIN OF LEFT SHOULDER: ICD-10-CM

## 2024-07-15 PROCEDURE — 97110 THERAPEUTIC EXERCISES: CPT | Mod: CQ

## 2024-07-15 PROCEDURE — 97112 NEUROMUSCULAR REEDUCATION: CPT | Mod: CQ

## 2024-07-15 NOTE — PROGRESS NOTES
[facility]   Physical Therapy Treatment Note      Name: Eugenio Judge  Clinic Number: [id number]    Therapy Diagnosis:   Encounter Diagnoses   Name Primary?    Cervical radiculopathy Yes    Acute pain of left shoulder     Muscle weakness of left upper extremity      Physician: Jorge Kenyon MD    Visit Date: 7/15/2024    Physician Orders: PT Eval and Treat   Medical Diagnosis from Referral: see above  Evaluation Date: 6/19/2024  Authorization Period Expiration: 6/10/2025  Plan of Care Expiration: 8/16/2024     Date of Surgery: n/a  Visit # / Visits authorized: 6/ 60 - HARD MAX  FOTO: 1/ 3 = 84     Precautions: Standard  PTA Visit #: 5/5     Time In: 4:00 pm  Time Out:  4:47 pm  Total Billable Time:  47 minutes    Subjective     Pt reports: He had some N/T on Friday, but has been fine since.    He was compliant with home exercise program.  Response to previous treatment: no complaints   Functional change: no change noted    Pain: 0/10  Location: left shoulder  and neck    Objective      Range of motion  Motion Right  Left    Shoulder flexion WITHIN FUNCTIONAL LIMITS WITHIN FUNCTIONAL LIMITS   Shoulder extension WITHIN FUNCTIONAL LIMITS WITHIN FUNCTIONAL LIMITS   Shoulder abduction WITHIN FUNCTIONAL LIMITS WITHIN FUNCTIONAL LIMITS   Shoulder internal rotation WITHIN FUNCTIONAL LIMITS WITHIN FUNCTIONAL LIMITS   Shoulder external rotation WITHIN FUNCTIONAL LIMITS WITHIN FUNCTIONAL LIMITS   Elbow flexion WITHIN FUNCTIONAL LIMITS WITHIN FUNCTIONAL LIMITS   Elbow extension WITHIN FUNCTIONAL LIMITS WITHIN FUNCTIONAL LIMITS      Cervical range of motion within functional limits except 25% limited with lateral flexion to the right     MANUAL MUSCLE TEST  Muscle Right  Left    Shoulder flexion MMT strength: 5/5 MMT strength: 5/5   Shoulder extension MMT strength: 5/5 MMT strength: 5/5   Shoulder abduction MMT strength: 5/5 MMT strength: 5/5   Shoulder internal rotation MMT strength: 5/5  MMT strength: 5/5   Shoulder external rotation MMT strength: 4+/5 MMT strength: 4+/5   Elbow flexion MMT strength: 5/5 MMT strength: 5/5   Elbow extension MMT strength: 5/5 MMT strength: 5/5      Functional ability:  Dressing: independent  Driving:  independent  Overhead activity:  independent  Work/hobbies:  independent       Treatment     Eugenio received the treatments listed below:      therapeutic exercises to develop strength, endurance, ROM, flexibility, posture, and core stabilization for  9 minutes including:  UBE x 5 minutes   Corner stretch--3 x 30 seconds   chin tuck with cervical retraction in T-Drill--10 x 10 second hold--on blue bolster today    manual therapy techniques: Joint mobilizations, Manual traction, Myofacial release, Soft tissue Mobilization, and Friction Massage were applied to the: cervical spine for 0 minutes, including:  -    (Not Today)  Manual traction  UT/SCM Stretch (L)    neuromuscular re-education activities to improve: Balance, Coordination, Kinesthetic Sense, Proprioception, and Posture for 38 minutes. The following activities were included:  scapular retraction/extension w/ chin tuck--blue band--3 x 10  Prone retraction with ext--10 x 10 sec--2#  Prone 90/90 lift--10 x 10 sec--2#  Face Pulls, blue band --3 x 10  Bilateral ER with sustained scap retract--Blue --3  x 10  Whitman and arrows--Blue band--3 x 10  Prone hor abd--2#--10 x 10 sec  Resisted Hor Abd--Green band--3 x 10  Reverse door slides--3 x 20    (Not Today)  Cybex rows 3 plates 3 x 10    therapeutic activities to improve functional performance for 0  minutes, including:  -    supervised modalities after being cleared for contradictions: Mechanical Traction:  Eugenio received static mechanical traction to the cervical spine at a force of 25 pounds for a total of 0 minutes. Hold time of 45 seconds and rest time for 15  Seconds. Patient tolerated treatment well without any adverse effects.    Patient Education and Home Exercises        Education provided:   - review of home exercise program and current Plan of Care/rationale of treatment.    Written Home Exercises Provided: Patient instructed to cont prior HEP. Exercises were reviewed and Eugenio was able to demonstrate them prior to the end of the session.  Eugenio demonstrated good understanding of the education provided. See EMR under Patient Instructions for exercises provided during therapy sessions    Assessment     At Evaluation:  Eugenio is a 58 y.o. male referred to outpatient Physical Therapy with a medical diagnosis of left shoulder pain/bursitis. Patient presents with complaints of left shoulder feeling like it is going numb or dead with certain movements. He says it is not really a pain - kind of hard to describe the sensation. He did say that the second injection he had has really helped the symptoms. He has full range of motion of left shoulder with only mild twinge at end range of abduction. He did not have any pain with any shoulder special tests and has good strength throughout the left upper extremity. He is slightly limited with cervical lateral flexion to the right. He did have a positive Spurling's test on the left. Will work on scapular strengthening to improve posture and utilize cervical traction to address radicular symptoms.     Current Assessment:  Eugenio did not have much tightness noted in cervical muscles today. He had some N/T on Friday, but not since.  He performed some exercises to increase scapular awareness and strengthen parascapular muscle to improve posture. Did not do traction today.  Added reverse door slides and horizontal abduction without issue.  Will probably DC next visit.  Cont POC    Eugenio Is progressing towards his goals.   Pt prognosis is Good.     Pt will continue to benefit from skilled outpatient physical therapy to address the deficits listed in the problem list box on initial evaluation, provide pt/family education and to maximize pt's level of  independence in the home and community environment.     Pt's spiritual, cultural and educational needs considered and pt agreeable to plan of care and goals.     Anticipated barriers to physical therapy: none    Goals:    Patient will be independent with home exercise program to facilitate carryover between visits.  Patient will have active range of motion of left shoulder in all directions without heavy/numb feeling in left upper extremity.  Patient will have 5/5 strength left shoulder/upper extremity to perform household chores and work related tasks.  Patient will place 5# dumbbell on head height shelf without hiking or pain left shoulder.  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        Plan     Plan of care Certification: 6/19/2024 to 8/16/2024.     Outpatient Physical Therapy 2 times weekly for 8 weeks to include the following interventions: Cervical/Lumbar Traction, Electrical Stimulation IFC/hivolt/premod as needed, Manual Therapy, Moist Heat/ Ice, Neuromuscular Re-ed, Patient Education, Therapeutic Activities, and Therapeutic Exercise.     Lalo Brannon, PTA   07/15/2024

## 2024-07-16 ENCOUNTER — OFFICE VISIT (OUTPATIENT)
Dept: FAMILY MEDICINE | Facility: CLINIC | Age: 59
End: 2024-07-16
Payer: COMMERCIAL

## 2024-07-16 VITALS
HEIGHT: 66 IN | WEIGHT: 175 LBS | TEMPERATURE: 98 F | RESPIRATION RATE: 17 BRPM | SYSTOLIC BLOOD PRESSURE: 138 MMHG | DIASTOLIC BLOOD PRESSURE: 87 MMHG | HEART RATE: 62 BPM | BODY MASS INDEX: 28.12 KG/M2 | OXYGEN SATURATION: 98 %

## 2024-07-16 DIAGNOSIS — L23.7 POISON IVY: Primary | ICD-10-CM

## 2024-07-16 PROCEDURE — 3075F SYST BP GE 130 - 139MM HG: CPT | Mod: ,,, | Performed by: FAMILY MEDICINE

## 2024-07-16 PROCEDURE — 3079F DIAST BP 80-89 MM HG: CPT | Mod: ,,, | Performed by: FAMILY MEDICINE

## 2024-07-16 PROCEDURE — 1160F RVW MEDS BY RX/DR IN RCRD: CPT | Mod: ,,, | Performed by: FAMILY MEDICINE

## 2024-07-16 PROCEDURE — 3008F BODY MASS INDEX DOCD: CPT | Mod: ,,, | Performed by: FAMILY MEDICINE

## 2024-07-16 PROCEDURE — 96372 THER/PROPH/DIAG INJ SC/IM: CPT | Mod: ,,, | Performed by: FAMILY MEDICINE

## 2024-07-16 PROCEDURE — 99214 OFFICE O/P EST MOD 30 MIN: CPT | Mod: 25,,, | Performed by: FAMILY MEDICINE

## 2024-07-16 PROCEDURE — 1159F MED LIST DOCD IN RCRD: CPT | Mod: ,,, | Performed by: FAMILY MEDICINE

## 2024-07-16 RX ORDER — PREDNISONE 20 MG/1
40 TABLET ORAL DAILY
Qty: 10 TABLET | Refills: 0 | Status: SHIPPED | OUTPATIENT
Start: 2024-07-16 | End: 2024-07-21

## 2024-07-16 RX ORDER — DEXAMETHASONE SODIUM PHOSPHATE 4 MG/ML
6 INJECTION, SOLUTION INTRA-ARTICULAR; INTRALESIONAL; INTRAMUSCULAR; INTRAVENOUS; SOFT TISSUE
Status: COMPLETED | OUTPATIENT
Start: 2024-07-16 | End: 2024-07-16

## 2024-07-16 RX ADMIN — DEXAMETHASONE SODIUM PHOSPHATE 6 MG: 4 INJECTION, SOLUTION INTRA-ARTICULAR; INTRALESIONAL; INTRAMUSCULAR; INTRAVENOUS; SOFT TISSUE at 01:07

## 2024-07-16 NOTE — PROGRESS NOTES
Subjective:       Patient ID: Eugenio Judge is a 58 y.o. male.    Chief Complaint: Poison Ivy (Poison ivy on inner arms and left thigh started on Friday. )    Poison Ivy  Pertinent negatives include no congestion, cough, diarrhea, eye pain, fatigue, fever, rhinorrhea, shortness of breath, sore throat or vomiting.     Review of Systems   Constitutional:  Negative for activity change, appetite change, chills, diaphoresis, fatigue, fever and unexpected weight change.   HENT:  Negative for nasal congestion, dental problem, drooling, ear discharge, ear pain, facial swelling, hearing loss, mouth sores, nosebleeds, postnasal drip, rhinorrhea, sinus pressure/congestion, sneezing, sore throat, tinnitus, trouble swallowing, voice change and goiter.    Eyes:  Negative for photophobia, pain, discharge, redness, itching and visual disturbance.   Respiratory:  Negative for apnea, cough, choking, chest tightness, shortness of breath, wheezing and stridor.    Cardiovascular:  Negative for chest pain, palpitations, leg swelling and claudication.   Gastrointestinal:  Negative for abdominal distention, abdominal pain, anal bleeding, blood in stool, change in bowel habit, constipation, diarrhea, nausea, vomiting, reflux and fecal incontinence.   Endocrine: Negative for cold intolerance, heat intolerance, polydipsia, polyphagia and polyuria.   Genitourinary:  Negative for bladder incontinence, decreased urine volume, difficulty urinating, discharge, dysuria, enuresis, erectile dysfunction, flank pain, frequency, genital sores, hematuria, penile pain, testicular pain and urgency.   Musculoskeletal:  Negative for arthralgias, back pain, gait problem, joint swelling, leg pain, myalgias, neck pain, neck stiffness and joint deformity.   Integumentary:  Positive for rash. Negative for pallor, wound and mole/lesion.   Allergic/Immunologic: Negative for environmental allergies, food allergies and frequent infections.   Neurological:   Negative for dizziness, vertigo, tremors, seizures, syncope, facial asymmetry, speech difficulty, weakness, light-headedness, numbness, headaches, memory loss and coordination difficulties.   Hematological:  Negative for adenopathy. Does not bruise/bleed easily.   Psychiatric/Behavioral:  Negative for agitation, behavioral problems, confusion, decreased concentration, dysphoric mood, hallucinations, self-injury, sleep disturbance and suicidal ideas. The patient is not nervous/anxious and is not hyperactive.          Objective:      Physical Exam  Vitals reviewed.   Constitutional:       Appearance: Normal appearance. He is normal weight.   HENT:      Head: Normocephalic and atraumatic.      Right Ear: Tympanic membrane and ear canal normal.      Left Ear: Tympanic membrane, ear canal and external ear normal.      Nose: Nose normal.      Mouth/Throat:      Mouth: Mucous membranes are moist.      Pharynx: Oropharynx is clear.   Eyes:      Extraocular Movements: Extraocular movements intact.      Conjunctiva/sclera: Conjunctivae normal.      Pupils: Pupils are equal, round, and reactive to light.   Cardiovascular:      Rate and Rhythm: Normal rate and regular rhythm.      Pulses: Normal pulses.      Heart sounds: Normal heart sounds.   Pulmonary:      Effort: Pulmonary effort is normal.      Breath sounds: Normal breath sounds.   Abdominal:      General: Abdomen is flat. Bowel sounds are normal.      Palpations: Abdomen is soft.   Musculoskeletal:         General: Normal range of motion.      Cervical back: Normal range of motion and neck supple.   Skin:     General: Skin is warm and dry.      Findings: Rash present.   Neurological:      General: No focal deficit present.      Mental Status: He is alert and oriented to person, place, and time. Mental status is at baseline.   Psychiatric:         Mood and Affect: Mood normal.         Behavior: Behavior normal.         Thought Content: Thought content normal.          Judgment: Judgment normal.         Assessment:       1. Poison ivy        Plan:     Poison ivy  -     dexAMETHasone injection 6 mg  -     predniSONE (DELTASONE) 20 MG tablet; Take 2 tablets (40 mg total) by mouth once daily. for 5 days  Dispense: 10 tablet; Refill: 0

## 2024-07-17 ENCOUNTER — CLINICAL SUPPORT (OUTPATIENT)
Dept: REHABILITATION | Facility: HOSPITAL | Age: 59
End: 2024-07-17
Payer: COMMERCIAL

## 2024-07-17 DIAGNOSIS — M62.81 MUSCLE WEAKNESS OF LEFT UPPER EXTREMITY: ICD-10-CM

## 2024-07-17 DIAGNOSIS — M25.512 ACUTE PAIN OF LEFT SHOULDER: ICD-10-CM

## 2024-07-17 DIAGNOSIS — M54.12 CERVICAL RADICULOPATHY: Primary | ICD-10-CM

## 2024-07-17 PROCEDURE — 97110 THERAPEUTIC EXERCISES: CPT

## 2024-07-17 PROCEDURE — 97112 NEUROMUSCULAR REEDUCATION: CPT | Mod: CQ

## 2024-07-17 NOTE — PROGRESS NOTES
[facility]   Physical Therapy Treatment Note      Name: Eugenio Judge  Clinic Number: [id number]    Therapy Diagnosis:   Encounter Diagnoses   Name Primary?    Cervical radiculopathy Yes    Acute pain of left shoulder     Muscle weakness of left upper extremity      Physician: Jorge Kenyon MD    Visit Date: 7/17/2024    Physician Orders: PT Eval and Treat   Medical Diagnosis from Referral: see above  Evaluation Date: 6/19/2024  Authorization Period Expiration: 6/10/2025  Plan of Care Expiration: 8/16/2024     Date of Surgery: n/a  Visit # / Visits authorized: 7/ 60 - HARD MAX  FOTO: 1/ 3 = 84     Precautions: Standard  PTA Visit #: 1/5     Time In: 4:00 pm  Time Out:  4:08 pm  Total Billable Time:  8 minutes    Subjective     Pt reports: No pain today.  He was compliant with home exercise program.  Response to previous treatment: no complaints   Functional change: no change noted    Pain: 0/10  Location: left shoulder  and neck    Objective      Range of motion  Motion Right  Left    Shoulder flexion WITHIN FUNCTIONAL LIMITS WITHIN FUNCTIONAL LIMITS   Shoulder extension WITHIN FUNCTIONAL LIMITS WITHIN FUNCTIONAL LIMITS   Shoulder abduction WITHIN FUNCTIONAL LIMITS WITHIN FUNCTIONAL LIMITS   Shoulder internal rotation WITHIN FUNCTIONAL LIMITS WITHIN FUNCTIONAL LIMITS   Shoulder external rotation WITHIN FUNCTIONAL LIMITS WITHIN FUNCTIONAL LIMITS   Elbow flexion WITHIN FUNCTIONAL LIMITS WITHIN FUNCTIONAL LIMITS   Elbow extension WITHIN FUNCTIONAL LIMITS WITHIN FUNCTIONAL LIMITS      Cervical range of motion within functional limits except 25% limited with lateral flexion to the right     MANUAL MUSCLE TEST  Muscle Right  Left    Shoulder flexion MMT strength: 5/5 MMT strength: 5/5   Shoulder extension MMT strength: 5/5 MMT strength: 5/5   Shoulder abduction MMT strength: 5/5 MMT strength: 5/5   Shoulder internal rotation MMT strength: 5/5 MMT strength: 5/5   Shoulder external  rotation MMT strength: 4+/5 MMT strength: 4+/5   Elbow flexion MMT strength: 5/5 MMT strength: 5/5   Elbow extension MMT strength: 5/5 MMT strength: 5/5      Functional ability:  Dressing: independent  Driving:  independent  Overhead activity:  independent  Work/hobbies:  independent       Treatment     Eugenio received the treatments listed below:      therapeutic exercises to develop strength, endurance, ROM, flexibility, posture, and core stabilization for  8 minutes including:   Exercises in red performed by Teresa Jiménez DPT as part of the Supervisory Visit  UBE x 5 minutes   Corner stretch--3 x 30 seconds   chin tuck with cervical retraction in T-Drill--10 x 10 second hold--on blue bolster today    manual therapy techniques: Joint mobilizations, Manual traction, Myofacial release, Soft tissue Mobilization, and Friction Massage were applied to the: cervical spine for 0 minutes, including:  -    (Not Today)  Manual traction  UT/SCM Stretch (L)    neuromuscular re-education activities to improve: Balance, Coordination, Kinesthetic Sense, Proprioception, and Posture for 32 minutes. The following activities were included:  scapular retraction/extension w/ chin tuck--blue band--3 x 10  Prone retraction with ext--10 x 10 sec--2#  Prone 90/90 lift--10 x 10 sec--2#  Face Pulls, blue band --3 x 10  Bilateral ER with sustained scap retract--Blue --3  x 10  Beulah and arrows--Blue band--3 x 10  Prone hor abd--2#--10 x 10 sec  Resisted Hor Abd--Green band--3 x 10  Reverse door slides--3 x 20  Cybex rows 3 plates 3 x 10    therapeutic activities to improve functional performance for 0  minutes, including:  -    supervised modalities after being cleared for contradictions: Mechanical Traction:  Eugenio received static mechanical traction to the cervical spine at a force of 25 pounds for a total of 0 minutes. Hold time of 45 seconds and rest time for 15  Seconds. Patient tolerated treatment well without any adverse effects.    Patient  Education and Home Exercises       Education provided:   - review of home exercise program and current Plan of Care/rationale of treatment.    Written Home Exercises Provided: Patient instructed to cont prior HEP. Exercises were reviewed and Eugenio was able to demonstrate them prior to the end of the session.  Eugenio demonstrated good understanding of the education provided. See EMR under Patient Instructions for exercises provided during therapy sessions    Assessment     At Evaluation:  Eugenio is a 58 y.o. male referred to outpatient Physical Therapy with a medical diagnosis of left shoulder pain/bursitis. Patient presents with complaints of left shoulder feeling like it is going numb or dead with certain movements. He says it is not really a pain - kind of hard to describe the sensation. He did say that the second injection he had has really helped the symptoms. He has full range of motion of left shoulder with only mild twinge at end range of abduction. He did not have any pain with any shoulder special tests and has good strength throughout the left upper extremity. He is slightly limited with cervical lateral flexion to the right. He did have a positive Spurling's test on the left. Will work on scapular strengthening to improve posture and utilize cervical traction to address radicular symptoms.     Current Assessment:  Eugenio is doing well with Therapy. Most of his pain is resolving. He does have occasional pain in neck and numbness in Left shoulder with certain movements, but overall he is responding very well to the current Therapy Plan of Care. Plan to see patient for 1 more visit next week. This will complete 6 full weeks of Therapy. If he continues to be pain free at that time the plan is to discharge him from Therapy. He will be referred back to MD for further assessment as needed. Sup Visit performed today with NAPOLEON Franks and NAPOLEON Castillo.  All goals and treatment plan reviewed.         Eugenio Is  progressing towards his goals.   Pt prognosis is Good.     Pt will continue to benefit from skilled outpatient physical therapy to address the deficits listed in the problem list box on initial evaluation, provide pt/family education and to maximize pt's level of independence in the home and community environment.     Pt's spiritual, cultural and educational needs considered and pt agreeable to plan of care and goals.     Anticipated barriers to physical therapy: none    Goals:    Patient will be independent with home exercise program to facilitate carryover between visits.  Patient will have active range of motion of left shoulder in all directions without heavy/numb feeling in left upper extremity.  Patient will have 5/5 strength left shoulder/upper extremity to perform household chores and work related tasks.  Patient will place 5# dumbbell on head height shelf without hiking or pain left shoulder.  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        Plan     Plan of care Certification: 6/19/2024 to 8/16/2024.     Outpatient Physical Therapy 2 times weekly for 8 weeks to include the following interventions: Cervical/Lumbar Traction, Electrical Stimulation IFC/hivolt/premod as needed, Manual Therapy, Moist Heat/ Ice, Neuromuscular Re-ed, Patient Education, Therapeutic Activities, and Therapeutic Exercise.     Caren Spencer, PTA   07/17/2024

## 2024-07-24 ENCOUNTER — CLINICAL SUPPORT (OUTPATIENT)
Dept: REHABILITATION | Facility: HOSPITAL | Age: 59
End: 2024-07-24
Payer: COMMERCIAL

## 2024-07-24 DIAGNOSIS — M54.12 CERVICAL RADICULOPATHY: Primary | ICD-10-CM

## 2024-07-24 DIAGNOSIS — M25.512 ACUTE PAIN OF LEFT SHOULDER: ICD-10-CM

## 2024-07-24 DIAGNOSIS — M62.81 MUSCLE WEAKNESS OF LEFT UPPER EXTREMITY: ICD-10-CM

## 2024-07-24 PROCEDURE — 97110 THERAPEUTIC EXERCISES: CPT | Mod: CQ

## 2024-07-24 PROCEDURE — 97112 NEUROMUSCULAR REEDUCATION: CPT | Mod: CQ

## 2024-07-24 NOTE — PROGRESS NOTES
[facility]   Physical Therapy Treatment and Discharge Note      Name: Eugenio Judge  Clinic Number: [id number]    Therapy Diagnosis:   Encounter Diagnoses   Name Primary?    Cervical radiculopathy Yes    Acute pain of left shoulder     Muscle weakness of left upper extremity      Physician: Jorge Kenyon MD    Visit Date: 7/24/2024    Physician Orders: PT Eval and Treat   Medical Diagnosis from Referral: see above  Evaluation Date: 6/19/2024  Authorization Period Expiration: 6/10/2025  Plan of Care Expiration: 8/16/2024     Date of Surgery: n/a  Visit # / Visits authorized: 8/ 60 - HARD MAX  FOTO: 1/ 3 = 8    2/3 = 101 @ DC     Precautions: Standard  PTA Visit #: 2/5     Time In: 3:56 pm  Time Out:  4:42 pm  Total Billable Time: 46 individual minutes    Subjective     Pt reports: No pain or N/T today.  Reports he has not had any symptoms in 3-4 days.    He was compliant with home exercise program.  Response to previous treatment: no complaints   Functional change: no change noted    Pain: 0/10  Location: left shoulder and neck    Objective      Range of motion  Motion Right  Left    Shoulder flexion WITHIN FUNCTIONAL LIMITS WITHIN FUNCTIONAL LIMITS   Shoulder extension WITHIN FUNCTIONAL LIMITS WITHIN FUNCTIONAL LIMITS   Shoulder abduction WITHIN FUNCTIONAL LIMITS WITHIN FUNCTIONAL LIMITS   Shoulder internal rotation WITHIN FUNCTIONAL LIMITS WITHIN FUNCTIONAL LIMITS   Shoulder external rotation WITHIN FUNCTIONAL LIMITS WITHIN FUNCTIONAL LIMITS   Elbow flexion WITHIN FUNCTIONAL LIMITS WITHIN FUNCTIONAL LIMITS   Elbow extension WITHIN FUNCTIONAL LIMITS WITHIN FUNCTIONAL LIMITS      Cervical range of motion within functional limits except 25% limited with lateral flexion to the right     MANUAL MUSCLE TEST  Muscle Right  Left    Shoulder flexion MMT strength: 5/5 MMT strength: 5/5   Shoulder extension MMT strength: 5/5 MMT strength: 5/5   Shoulder abduction MMT strength: 5/5 MMT  strength: 5/5   Shoulder internal rotation MMT strength: 5/5 MMT strength: 5/5   Shoulder external rotation MMT strength: 5/5 MMT strength: 5/5   Elbow flexion MMT strength: 5/5 MMT strength: 5/5   Elbow extension MMT strength: 5/5 MMT strength: 5/5      Functional ability:  Dressing: independent  Driving:  independent  Overhead activity:  independent  Work/hobbies:  independent       Treatment     Eugenio received the treatments listed below:      therapeutic exercises to develop strength, endurance, ROM, flexibility, posture, and core stabilization for  8 minutes including:   Exercises in red performed by Teresa Jiménez DPT as part of the Supervisory Visit  UBE x 5 minutes   Corner stretch--3 x 30 seconds   chin tuck with cervical retraction in T-Drill--10 x 10 second hold--on blue bolster today    manual therapy techniques: Joint mobilizations, Manual traction, Myofacial release, Soft tissue Mobilization, and Friction Massage were applied to the: cervical spine for 0 minutes, including:  -    (Not Today)  Manual traction  UT/SCM Stretch (L)    neuromuscular re-education activities to improve: Balance, Coordination, Kinesthetic Sense, Proprioception, and Posture for 38 minutes. The following activities were included:  scapular retraction/extension w/ chin tuck--blue band--3 x 10  Prone retraction with ext--10 x 10 sec--2#  Prone 90/90 lift--10 x 10 sec--2#  Face Pulls, blue band --3 x 10  Bilateral ER with sustained scap retract--Blue --3  x 10  Valier and arrows--Blue band--3 x 10  Prone hor abd--2#--10 x 10 sec  Resisted Hor Abd--Green band--3 x 10  Reverse door slides--3 x 20  Cybex rows 3 plates 3 x 10    therapeutic activities to improve functional performance for 0  minutes, including:  -    supervised modalities after being cleared for contradictions: Mechanical Traction:  Eugenio received static mechanical traction to the cervical spine at a force of 25 pounds for a total of 0 minutes. Hold time of 45 seconds and rest  time for 15  Seconds. Patient tolerated treatment well without any adverse effects.    Patient Education and Home Exercises       Education provided:   - review of home exercise program and current Plan of Care/rationale of treatment.    Written Home Exercises Provided: Patient instructed to cont prior HEP. Exercises were reviewed and Eugenio was able to demonstrate them prior to the end of the session.  Eugenio demonstrated good understanding of the education provided. See EMR under Patient Instructions for exercises provided during therapy sessions    Assessment     At Evaluation:  Eugenio is a 58 y.o. male referred to outpatient Physical Therapy with a medical diagnosis of left shoulder pain/bursitis. Patient presents with complaints of left shoulder feeling like it is going numb or dead with certain movements. He says it is not really a pain - kind of hard to describe the sensation. He did say that the second injection he had has really helped the symptoms. He has full range of motion of left shoulder with only mild twinge at end range of abduction. He did not have any pain with any shoulder special tests and has good strength throughout the left upper extremity. He is slightly limited with cervical lateral flexion to the right. He did have a positive Spurling's test on the left. Will work on scapular strengthening to improve posture and utilize cervical traction to address radicular symptoms.     Current Assessment:  Eugenio is doing well with Therapy. He reported no pain upon arrival. Also reports he has not had any numbness, tingling, or pain in neck or L shoulder in the last 4 days.  Today's treatment focused on parascapular strengthening exercises. Reports he had a good HEP and was going to continue that.  He has met all of his goals and will be discharged today.       Eugenio Is progressing towards his goals.   Pt prognosis is Good.     Pt's spiritual, cultural and educational needs considered and pt agreeable to plan of care  and goals.     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 --No Recent Pain @ work--MET       Plan     Patient has met all goals and will be DC form PT     Lalo Brannon, PTA   07/24/2024

## 2024-10-01 ENCOUNTER — DOCUMENTATION ONLY (OUTPATIENT)
Dept: REHABILITATION | Facility: HOSPITAL | Age: 59
End: 2024-10-01
Payer: COMMERCIAL

## 2024-10-01 PROBLEM — M62.81 MUSCLE WEAKNESS OF LEFT UPPER EXTREMITY: Status: RESOLVED | Noted: 2024-06-24 | Resolved: 2024-10-01

## 2024-10-01 PROBLEM — M54.12 CERVICAL RADICULOPATHY: Status: RESOLVED | Noted: 2024-06-24 | Resolved: 2024-10-01

## 2024-10-01 PROBLEM — M25.512 LEFT SHOULDER PAIN: Status: RESOLVED | Noted: 2024-06-24 | Resolved: 2024-10-01

## 2024-10-01 NOTE — PROGRESS NOTES
[facility]   Physical Therapy Discharge Summary       Name: Eugenio Judge  Clinic Number: [id number]     Therapy Diagnosis:        Encounter Diagnoses   Name Primary?    Cervical radiculopathy Yes    Acute pain of left shoulder      Muscle weakness of left upper extremity        Physician: Jorge Kenyon MD     Last Visit Date: 7/24/2024     Physician Orders: PT Eval and Treat   Medical Diagnosis from Referral: see above  Evaluation Date: 6/19/2024  Authorization Period Expiration: 6/10/2025  Plan of Care Expiration: 8/16/2024     Date of Surgery: n/a  Visit # / Visits authorized: 8/ 60 - HARD MAX  FOTO: 1/ 3 = 8                          2/3 = 101 @ DC     Last physical therapy treatment performed by Lalo Brannon PTA.    Assessment    Range of motion  Motion Right  Left    Shoulder flexion WITHIN FUNCTIONAL LIMITS WITHIN FUNCTIONAL LIMITS   Shoulder extension WITHIN FUNCTIONAL LIMITS WITHIN FUNCTIONAL LIMITS   Shoulder abduction WITHIN FUNCTIONAL LIMITS WITHIN FUNCTIONAL LIMITS   Shoulder internal rotation WITHIN FUNCTIONAL LIMITS WITHIN FUNCTIONAL LIMITS   Shoulder external rotation WITHIN FUNCTIONAL LIMITS WITHIN FUNCTIONAL LIMITS   Elbow flexion WITHIN FUNCTIONAL LIMITS WITHIN FUNCTIONAL LIMITS   Elbow extension WITHIN FUNCTIONAL LIMITS WITHIN FUNCTIONAL LIMITS      Cervical range of motion within functional limits except 25% limited with lateral flexion to the right     MANUAL MUSCLE TEST  Muscle Right  Left    Shoulder flexion MMT strength: 5/5 MMT strength: 5/5   Shoulder extension MMT strength: 5/5 MMT strength: 5/5   Shoulder abduction MMT strength: 5/5 MMT strength: 5/5   Shoulder internal rotation MMT strength: 5/5 MMT strength: 5/5   Shoulder external rotation MMT strength: 5/5 MMT strength: 5/5   Elbow flexion MMT strength: 5/5 MMT strength: 5/5   Elbow extension MMT strength: 5/5 MMT strength: 5/5      Functional ability:  Dressing: independent  Driving:   independent  Overhead activity:  independent  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 He reported no pain upon arrival. Also reports he has not had any numbness, tingling, or pain in neck or L shoulder in the last 4 days.  Today's treatment focused on parascapular strengthening exercises. Reports he had a good HEP and was going to continue that.  He has met all of his goals and will be discharged today.       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 --No Recent Pain @ work--MET    Discharge reason: Patient has met all of his/her goals    Plan   This patient is discharged from Physical Therapy effective 7/24/2024.    JUAN F ROYAL, PT  10/1/2024

## 2024-12-04 ENCOUNTER — OFFICE VISIT (OUTPATIENT)
Dept: FAMILY MEDICINE | Facility: CLINIC | Age: 59
End: 2024-12-04
Payer: COMMERCIAL

## 2024-12-04 VITALS
BODY MASS INDEX: 29.09 KG/M2 | OXYGEN SATURATION: 98 % | RESPIRATION RATE: 20 BRPM | SYSTOLIC BLOOD PRESSURE: 142 MMHG | DIASTOLIC BLOOD PRESSURE: 90 MMHG | HEIGHT: 66 IN | WEIGHT: 181 LBS | TEMPERATURE: 98 F | HEART RATE: 71 BPM

## 2024-12-04 DIAGNOSIS — J32.9 SINUSITIS, UNSPECIFIED CHRONICITY, UNSPECIFIED LOCATION: Primary | ICD-10-CM

## 2024-12-04 RX ORDER — DEXAMETHASONE SODIUM PHOSPHATE 4 MG/ML
6 INJECTION, SOLUTION INTRA-ARTICULAR; INTRALESIONAL; INTRAMUSCULAR; INTRAVENOUS; SOFT TISSUE
Status: COMPLETED | OUTPATIENT
Start: 2024-12-04 | End: 2024-12-04

## 2024-12-04 RX ORDER — AMOXICILLIN AND CLAVULANATE POTASSIUM 875; 125 MG/1; MG/1
1 TABLET, FILM COATED ORAL 2 TIMES DAILY
Qty: 14 TABLET | Refills: 0 | Status: SHIPPED | OUTPATIENT
Start: 2024-12-04 | End: 2024-12-11

## 2024-12-04 RX ORDER — CEFTRIAXONE 500 MG/1
500 INJECTION, POWDER, FOR SOLUTION INTRAMUSCULAR; INTRAVENOUS
Status: COMPLETED | OUTPATIENT
Start: 2024-12-04 | End: 2024-12-04

## 2024-12-04 RX ORDER — PREDNISONE 20 MG/1
40 TABLET ORAL DAILY
Qty: 10 TABLET | Refills: 0 | Status: SHIPPED | OUTPATIENT
Start: 2024-12-04 | End: 2024-12-09

## 2024-12-04 RX ADMIN — DEXAMETHASONE SODIUM PHOSPHATE 6 MG: 4 INJECTION, SOLUTION INTRA-ARTICULAR; INTRALESIONAL; INTRAMUSCULAR; INTRAVENOUS; SOFT TISSUE at 05:12

## 2024-12-04 RX ADMIN — CEFTRIAXONE 500 MG: 500 INJECTION, POWDER, FOR SOLUTION INTRAMUSCULAR; INTRAVENOUS at 05:12

## 2024-12-04 NOTE — PROGRESS NOTES
Subjective:       Patient ID: Eugenio Judge is a 59 y.o. male.    Chief Complaint: Sinus Problem (Sinus congestion, sneezing) and Cough (Slight cough)    Sinus Problem  Associated symptoms include congestion, coughing, sinus pressure and a sore throat. Pertinent negatives include no chills, diaphoresis, ear pain, headaches, neck pain, shortness of breath or sneezing.   Cough  Associated symptoms include rhinorrhea and a sore throat. Pertinent negatives include no chest pain, chills, ear pain, eye redness, fever, headaches, myalgias, postnasal drip, rash, shortness of breath or wheezing. There is no history of environmental allergies.     Review of Systems   Constitutional:  Negative for activity change, appetite change, chills, diaphoresis, fatigue, fever and unexpected weight change.   HENT:  Positive for nasal congestion, rhinorrhea, sinus pressure/congestion and sore throat. Negative for dental problem, drooling, ear discharge, ear pain, facial swelling, hearing loss, mouth sores, nosebleeds, postnasal drip, sneezing, tinnitus, trouble swallowing, voice change and goiter.    Eyes:  Negative for photophobia, pain, discharge, redness, itching and visual disturbance.   Respiratory:  Positive for cough. Negative for apnea, choking, chest tightness, shortness of breath, wheezing and stridor.    Cardiovascular:  Negative for chest pain, palpitations, leg swelling and claudication.   Gastrointestinal:  Negative for abdominal distention, abdominal pain, anal bleeding, blood in stool, change in bowel habit, constipation, diarrhea, nausea, vomiting, reflux and fecal incontinence.   Endocrine: Negative for cold intolerance, heat intolerance, polydipsia, polyphagia and polyuria.   Genitourinary:  Negative for bladder incontinence, decreased urine volume, difficulty urinating, discharge, dysuria, enuresis, erectile dysfunction, flank pain, frequency, genital sores, hematuria, penile pain, testicular pain and urgency.    Musculoskeletal:  Negative for arthralgias, back pain, gait problem, joint swelling, leg pain, myalgias, neck pain, neck stiffness and joint deformity.   Integumentary:  Negative for pallor, rash, wound and mole/lesion.   Allergic/Immunologic: Negative for environmental allergies, food allergies and frequent infections.   Neurological:  Negative for dizziness, vertigo, tremors, seizures, syncope, facial asymmetry, speech difficulty, weakness, light-headedness, numbness, headaches, memory loss and coordination difficulties.   Hematological:  Negative for adenopathy. Does not bruise/bleed easily.   Psychiatric/Behavioral:  Negative for agitation, behavioral problems, confusion, decreased concentration, dysphoric mood, hallucinations, self-injury, sleep disturbance and suicidal ideas. The patient is not nervous/anxious and is not hyperactive.          Objective:      Physical Exam  Vitals reviewed.   Constitutional:       Appearance: Normal appearance. He is normal weight.   HENT:      Head: Normocephalic and atraumatic.      Right Ear: Tympanic membrane and ear canal normal.      Left Ear: Tympanic membrane, ear canal and external ear normal.      Nose: Congestion and rhinorrhea present.      Mouth/Throat:      Mouth: Mucous membranes are moist.      Pharynx: Oropharynx is clear. Posterior oropharyngeal erythema present.   Eyes:      Extraocular Movements: Extraocular movements intact.      Conjunctiva/sclera: Conjunctivae normal.      Pupils: Pupils are equal, round, and reactive to light.   Cardiovascular:      Rate and Rhythm: Normal rate and regular rhythm.      Pulses: Normal pulses.      Heart sounds: Normal heart sounds.   Pulmonary:      Effort: Pulmonary effort is normal.      Breath sounds: Normal breath sounds.   Abdominal:      General: Abdomen is flat. Bowel sounds are normal.      Palpations: Abdomen is soft.   Musculoskeletal:         General: Normal range of motion.      Cervical back: Normal range  of motion and neck supple.   Skin:     General: Skin is warm and dry.   Neurological:      General: No focal deficit present.      Mental Status: He is alert and oriented to person, place, and time. Mental status is at baseline.   Psychiatric:         Mood and Affect: Mood normal.         Behavior: Behavior normal.         Thought Content: Thought content normal.         Judgment: Judgment normal.         Assessment:       1. Sinusitis, unspecified chronicity, unspecified location        Plan:     Sinusitis, unspecified chronicity, unspecified location  -     cefTRIAXone injection 500 mg  -     dexAMETHasone injection 6 mg  -     predniSONE (DELTASONE) 20 MG tablet; Take 2 tablets (40 mg total) by mouth once daily. for 5 days  Dispense: 10 tablet; Refill: 0  -     amoxicillin-clavulanate 875-125mg (AUGMENTIN) 875-125 mg per tablet; Take 1 tablet by mouth 2 (two) times a day. for 7 days  Dispense: 14 tablet; Refill: 0

## 2025-01-02 ENCOUNTER — OFFICE VISIT (OUTPATIENT)
Dept: FAMILY MEDICINE | Facility: CLINIC | Age: 60
End: 2025-01-02
Payer: COMMERCIAL

## 2025-01-02 VITALS
BODY MASS INDEX: 29.25 KG/M2 | DIASTOLIC BLOOD PRESSURE: 86 MMHG | TEMPERATURE: 98 F | HEART RATE: 69 BPM | SYSTOLIC BLOOD PRESSURE: 134 MMHG | HEIGHT: 66 IN | RESPIRATION RATE: 18 BRPM | WEIGHT: 182 LBS | OXYGEN SATURATION: 95 %

## 2025-01-02 DIAGNOSIS — Z00.00 ROUTINE HEALTH MAINTENANCE: ICD-10-CM

## 2025-01-02 DIAGNOSIS — R05.9 COUGH, UNSPECIFIED TYPE: Primary | ICD-10-CM

## 2025-01-02 DIAGNOSIS — I10 HYPERTENSION, UNSPECIFIED TYPE: ICD-10-CM

## 2025-01-02 LAB
ALBUMIN SERPL BCP-MCNC: 4 G/DL (ref 3.5–5)
ALBUMIN/GLOB SERPL: 1.1 {RATIO}
ALP SERPL-CCNC: 107 U/L (ref 40–150)
ALT SERPL W P-5'-P-CCNC: 40 U/L
ANION GAP SERPL CALCULATED.3IONS-SCNC: 12 MMOL/L (ref 7–16)
AST SERPL W P-5'-P-CCNC: 39 U/L (ref 5–34)
BASOPHILS # BLD AUTO: 0.04 K/UL (ref 0–0.2)
BASOPHILS NFR BLD AUTO: 0.6 % (ref 0–1)
BILIRUB SERPL-MCNC: 0.4 MG/DL
BUN SERPL-MCNC: 11 MG/DL (ref 8–26)
BUN/CREAT SERPL: 12 (ref 6–20)
CALCIUM SERPL-MCNC: 8.8 MG/DL (ref 8.4–10.2)
CHLORIDE SERPL-SCNC: 103 MMOL/L (ref 98–107)
CO2 SERPL-SCNC: 27 MMOL/L (ref 22–29)
CREAT SERPL-MCNC: 0.92 MG/DL (ref 0.72–1.25)
DIFFERENTIAL METHOD BLD: ABNORMAL
EGFR (NO RACE VARIABLE) (RUSH/TITUS): 96 ML/MIN/1.73M2
EOSINOPHIL # BLD AUTO: 0.29 K/UL (ref 0–0.5)
EOSINOPHIL NFR BLD AUTO: 4.2 % (ref 1–4)
ERYTHROCYTE [DISTWIDTH] IN BLOOD BY AUTOMATED COUNT: 11.9 % (ref 11.5–14.5)
EST. AVERAGE GLUCOSE BLD GHB EST-MCNC: 105 MG/DL
GLOBULIN SER-MCNC: 3.7 G/DL (ref 2–4)
GLUCOSE SERPL-MCNC: 75 MG/DL (ref 74–100)
HBA1C MFR BLD HPLC: 5.3 %
HCT VFR BLD AUTO: 41.8 % (ref 40–54)
HGB BLD-MCNC: 14.5 G/DL (ref 13.5–18)
IMM GRANULOCYTES # BLD AUTO: 0.05 K/UL (ref 0–0.04)
IMM GRANULOCYTES NFR BLD: 0.7 % (ref 0–0.4)
LYMPHOCYTES # BLD AUTO: 2.07 K/UL (ref 1–4.8)
LYMPHOCYTES NFR BLD AUTO: 30.2 % (ref 27–41)
MCH RBC QN AUTO: 30.9 PG (ref 27–31)
MCHC RBC AUTO-ENTMCNC: 34.7 G/DL (ref 32–36)
MCV RBC AUTO: 89.1 FL (ref 80–96)
MONOCYTES # BLD AUTO: 0.87 K/UL (ref 0–0.8)
MONOCYTES NFR BLD AUTO: 12.7 % (ref 2–6)
MPC BLD CALC-MCNC: 8.7 FL (ref 9.4–12.4)
NEUTROPHILS # BLD AUTO: 3.54 K/UL (ref 1.8–7.7)
NEUTROPHILS NFR BLD AUTO: 51.6 % (ref 53–65)
NRBC # BLD AUTO: 0 X10E3/UL
NRBC, AUTO (.00): 0 %
PLATELET # BLD AUTO: 355 K/UL (ref 150–400)
POTASSIUM SERPL-SCNC: 3.9 MMOL/L (ref 3.5–5.1)
PROT SERPL-MCNC: 7.7 G/DL (ref 6.4–8.3)
PSA SERPL-MCNC: 1.23 NG/ML
RBC # BLD AUTO: 4.69 M/UL (ref 4.6–6.2)
SODIUM SERPL-SCNC: 138 MMOL/L (ref 136–145)
WBC # BLD AUTO: 6.86 K/UL (ref 4.5–11)

## 2025-01-02 PROCEDURE — 1159F MED LIST DOCD IN RCRD: CPT | Mod: ,,, | Performed by: FAMILY MEDICINE

## 2025-01-02 PROCEDURE — 83036 HEMOGLOBIN GLYCOSYLATED A1C: CPT | Mod: ,,, | Performed by: CLINICAL MEDICAL LABORATORY

## 2025-01-02 PROCEDURE — 85025 COMPLETE CBC W/AUTO DIFF WBC: CPT | Mod: ,,, | Performed by: CLINICAL MEDICAL LABORATORY

## 2025-01-02 PROCEDURE — 1160F RVW MEDS BY RX/DR IN RCRD: CPT | Mod: ,,, | Performed by: FAMILY MEDICINE

## 2025-01-02 PROCEDURE — 80053 COMPREHEN METABOLIC PANEL: CPT | Mod: ,,, | Performed by: CLINICAL MEDICAL LABORATORY

## 2025-01-02 PROCEDURE — 96372 THER/PROPH/DIAG INJ SC/IM: CPT | Mod: GC,,, | Performed by: FAMILY MEDICINE

## 2025-01-02 PROCEDURE — 3044F HG A1C LEVEL LT 7.0%: CPT | Mod: ,,, | Performed by: FAMILY MEDICINE

## 2025-01-02 PROCEDURE — 99214 OFFICE O/P EST MOD 30 MIN: CPT | Mod: 25,GC,, | Performed by: FAMILY MEDICINE

## 2025-01-02 PROCEDURE — 3075F SYST BP GE 130 - 139MM HG: CPT | Mod: ,,, | Performed by: FAMILY MEDICINE

## 2025-01-02 PROCEDURE — G0103 PSA SCREENING: HCPCS | Mod: ,,, | Performed by: CLINICAL MEDICAL LABORATORY

## 2025-01-02 PROCEDURE — 3079F DIAST BP 80-89 MM HG: CPT | Mod: ,,, | Performed by: FAMILY MEDICINE

## 2025-01-02 PROCEDURE — 3008F BODY MASS INDEX DOCD: CPT | Mod: ,,, | Performed by: FAMILY MEDICINE

## 2025-01-02 RX ORDER — AMLODIPINE BESYLATE 5 MG/1
5 TABLET ORAL DAILY
Qty: 30 TABLET | Refills: 2 | Status: SHIPPED | OUTPATIENT
Start: 2025-01-02 | End: 2026-01-02

## 2025-01-02 RX ORDER — DEXAMETHASONE SODIUM PHOSPHATE 4 MG/ML
4 INJECTION, SOLUTION INTRA-ARTICULAR; INTRALESIONAL; INTRAMUSCULAR; INTRAVENOUS; SOFT TISSUE
Status: COMPLETED | OUTPATIENT
Start: 2025-01-02 | End: 2025-01-02

## 2025-01-02 RX ORDER — SILDENAFIL CITRATE 20 MG/1
TABLET ORAL
Qty: 30 TABLET | Refills: 2 | Status: SHIPPED | OUTPATIENT
Start: 2025-01-02

## 2025-01-02 RX ORDER — LEVOFLOXACIN 500 MG/1
500 TABLET, FILM COATED ORAL DAILY
Qty: 10 TABLET | Refills: 0 | Status: SHIPPED | OUTPATIENT
Start: 2025-01-02

## 2025-01-02 RX ADMIN — DEXAMETHASONE SODIUM PHOSPHATE 4 MG: 4 INJECTION, SOLUTION INTRA-ARTICULAR; INTRALESIONAL; INTRAMUSCULAR; INTRAVENOUS; SOFT TISSUE at 03:01

## 2025-01-02 NOTE — PROGRESS NOTES
Subjective:       Patient ID: Eugenio Judge is a 59 y.o. male.    Chief Complaint: Establish Care, Medication Refill, and Cough (Wants to know if he can get anything prescribed for the chronic cough he has been having )    58 yo male here to establish care, refill his medications and evaluate his cough.     Pt takes amlodipine 5 mg daily for HTN and takes sildenafil for ED. Pt states that these medications are working for him at their current dose and does not require any adjustment. He needs refills for both. Pt has no other chronic medical conditions.    Pt is due for a colonoscopy.     Pt has not had blood work in years.     Pt has had a chronic cough >1 month that has not gone away. Cough is sometimes productive with green sputum but primarily unproductive. Previously seen about a month ago by Dr. Cabrera and was given Levaquin and amoxicillin. Pt states that the cough has improved overtime but not at the rate he would like. No underlying pulmonary disease, no smoking history. No SOB, no fever, no chills.         Current Outpatient Medications:     amLODIPine (NORVASC) 5 MG tablet, Take 1 tablet (5 mg total) by mouth once daily., Disp: 30 tablet, Rfl: 2    amoxicillin (AMOXIL) 875 MG tablet, Take 1 tablet (875 mg total) by mouth every 12 (twelve) hours. (Patient not taking: Reported on 1/2/2025), Disp: 20 tablet, Rfl: 0    azithromycin (Z-NEIL) 250 MG tablet, Take 2 tablets by mouth on day 1; Take 1 tablet by mouth on days 2-5 (Patient not taking: Reported on 1/2/2025), Disp: 6 tablet, Rfl: 0    benzonatate (TESSALON) 100 MG capsule, Take 1 capsule (100 mg total) by mouth 3 (three) times daily as needed for Cough. (Patient not taking: Reported on 1/2/2025), Disp: 20 capsule, Rfl: 0    levoFLOXacin (LEVAQUIN) 500 MG tablet, Take 1 tablet (500 mg total) by mouth once daily., Disp: 10 tablet, Rfl: 0    sildenafil (REVATIO) 20 mg Tab, TAKE 2-5 TABLETS BY MOUTH ONE (1) HOUR PRIOR TO INTERCOURSE, Disp: 30  "tablet, Rfl: 2    Review of patient's allergies indicates:   Allergen Reactions    Covid-19 vac, bv (pfizer)(pf) Swelling, Other (See Comments) and Blisters     [id number] and [id number]  Fever    Grass pollen      Note: - Phreesia 06/12/2018    Adhesive tape-silicones Rash       Past Medical History:   Diagnosis Date    Kidney stone        Past Surgical History:   Procedure Laterality Date    APPENDECTOMY      COLON SURGERY      COLON SURGERY      ELBOW SURGERY      KNEE SURGERY      SHOULDER SURGERY      SHOULDER SURGERY         Family History   Problem Relation Name Age of Onset    Diabetes Father      Heart disease Father      Stroke Father      Hypertension Mother      Diabetes Sister         Social History     Tobacco Use    Smoking status: Never    Smokeless tobacco: Never   Substance Use Topics    Alcohol use: Not Currently    Drug use: Never       Review of Systems   Constitutional:  Negative for chills and fever.   Respiratory:  Positive for cough. Negative for shortness of breath and wheezing.    Cardiovascular:  Negative for chest pain, palpitations and leg swelling.   Gastrointestinal:  Negative for nausea and vomiting.           Objective:      Vitals:    01/02/25 1455 01/02/25 1459   BP: (!) 142/99 134/86   BP Location: Left arm Right arm   Patient Position: Sitting    Pulse: 69    Resp: 18    Temp: 97.8 °F (36.6 °C)    TempSrc: Oral    SpO2: 95%    Weight: 82.6 kg (182 lb)    Height: 5' 6" (1.676 m)      Physical Exam  Vitals and nursing note reviewed.   Constitutional:       Appearance: Normal appearance.   HENT:      Head: Normocephalic and atraumatic.   Cardiovascular:      Rate and Rhythm: Normal rate and regular rhythm.      Pulses: Normal pulses.      Heart sounds: Normal heart sounds. No murmur heard.     No friction rub. No gallop.   Pulmonary:      Effort: Pulmonary effort is normal. No respiratory distress.      Breath sounds: Normal breath sounds. No wheezing, rhonchi or " rales.   Skin:     General: Skin is warm and dry.   Neurological:      Mental Status: He is alert.   Psychiatric:         Mood and Affect: Mood normal.         Behavior: Behavior normal.       Lab Results   Component Value Date    WBC 6.86 01/02/2025    HGB 14.5 01/02/2025    HCT 41.8 01/02/2025     01/02/2025    CHOL 202 (H) 05/25/2021    TRIG 219 (H) 05/25/2021    HDL 39 (L) 05/25/2021    ALT 40 01/02/2025    AST 39 (H) 01/02/2025     01/02/2025    K 3.9 01/02/2025     01/02/2025    CREATININE 0.92 01/02/2025    BUN 11 01/02/2025    CO2 27 01/02/2025    PSA 1.232 01/02/2025    HGBA1C 5.3 01/02/2025      Assessment:       1. Cough, unspecified type    2. Routine health maintenance    3. Hypertension, unspecified type        Plan:         Problem List Items Addressed This Visit          Pulmonary    Cough - Primary     Previous URI several weeks ago that has left a residual cough.  Cough is sometimes productive with green sputum.  No SOB, fever, or chills.    Physical exam unremarkable.  Lungs CTAB.  CXR shows no sign of infection.    Likely a post-infection residual cough; possible that infectious process is still lingering.    Will order CXR.  Dexamethasone 4 mg for anti-inflammatory treatment of cough.  Will initiate levaquin 500 mg daily for 10 days.  Pt to follow up if cough does not improve over the next few days.  Pt to follow up in 3 months otherwise.           Relevant Orders    X-Ray Chest PA And Lateral (Completed)       Cardiac/Vascular    Hypertension     Pt has HTN that is well controlled with amlodipine 5 mg.  Will continue that therapy and continue to monitor.  Pt following up in 3 months or sooner if need be.            Other    Routine health maintenance     Pt is due for several health maintenance screening measures including:  Colonoscopy  CMP  Hemoglobin A1C  CBC  PSA    Will order all of the above.         Relevant Orders    Colonoscopy    Hemoglobin A1C (Completed)    CBC  Auto Differential (Completed)    Comprehensive Metabolic Panel (Completed)    PSA, Screening (Completed)         Follow up in about 3 months (around 4/2/2025).    Ezra Schedule, DO     Instructed patient that if symptoms fail to improve or worsen patient should seek immediate medical attention or report to the nearest emergency department. Patient expressed verbal agreement and understanding to this plan of care.

## 2025-01-09 PROBLEM — Z00.00 ROUTINE HEALTH MAINTENANCE: Status: ACTIVE | Noted: 2025-01-09

## 2025-01-09 PROBLEM — R05.3 CHRONIC COUGH: Status: ACTIVE | Noted: 2025-01-09

## 2025-01-09 PROBLEM — I10 HYPERTENSION: Status: ACTIVE | Noted: 2025-01-09

## 2025-01-09 PROBLEM — R05.9 COUGH: Status: ACTIVE | Noted: 2025-01-09

## 2025-01-09 NOTE — ASSESSMENT & PLAN NOTE
Pt has HTN that is well controlled with amlodipine 5 mg.  Will continue that therapy and continue to monitor.  Pt following up in 3 months or sooner if need be.

## 2025-01-09 NOTE — ASSESSMENT & PLAN NOTE
Previous URI several weeks ago that has left a residual cough.  Cough is sometimes productive with green sputum.  No SOB, fever, or chills.    Physical exam unremarkable.  Lungs CTAB.  CXR shows no sign of infection.    Likely a post-infection residual cough; possible that infectious process is still lingering.    Will order CXR.  Dexamethasone 4 mg for anti-inflammatory treatment of cough.  Will initiate levaquin 500 mg daily for 10 days.  Pt to follow up if cough does not improve over the next few days.  Pt to follow up in 3 months otherwise.

## 2025-01-09 NOTE — ASSESSMENT & PLAN NOTE
Pt is due for several health maintenance screening measures including:  Colonoscopy  CMP  Hemoglobin A1C  CBC  PSA    Will order all of the above.

## 2025-02-05 DIAGNOSIS — Z12.11 SCREENING FOR COLON CANCER: ICD-10-CM

## 2025-03-09 ENCOUNTER — PATIENT MESSAGE (OUTPATIENT)
Dept: ADMINISTRATIVE | Facility: HOSPITAL | Age: 60
End: 2025-03-09

## 2025-04-03 ENCOUNTER — OFFICE VISIT (OUTPATIENT)
Dept: FAMILY MEDICINE | Facility: CLINIC | Age: 60
End: 2025-04-03
Payer: COMMERCIAL

## 2025-04-03 VITALS
BODY MASS INDEX: 28.77 KG/M2 | OXYGEN SATURATION: 96 % | WEIGHT: 179 LBS | HEART RATE: 65 BPM | HEIGHT: 66 IN | TEMPERATURE: 98 F | RESPIRATION RATE: 18 BRPM | DIASTOLIC BLOOD PRESSURE: 93 MMHG | SYSTOLIC BLOOD PRESSURE: 132 MMHG

## 2025-04-03 DIAGNOSIS — I10 HYPERTENSION, UNSPECIFIED TYPE: ICD-10-CM

## 2025-04-03 DIAGNOSIS — N52.9 ERECTILE DYSFUNCTION, UNSPECIFIED ERECTILE DYSFUNCTION TYPE: ICD-10-CM

## 2025-04-03 DIAGNOSIS — Z00.00 ROUTINE HEALTH MAINTENANCE: Primary | ICD-10-CM

## 2025-04-03 PROBLEM — R05.9 COUGH: Status: RESOLVED | Noted: 2025-01-09 | Resolved: 2025-04-03

## 2025-04-03 PROCEDURE — 3008F BODY MASS INDEX DOCD: CPT | Mod: ,,, | Performed by: FAMILY MEDICINE

## 2025-04-03 PROCEDURE — 1159F MED LIST DOCD IN RCRD: CPT | Mod: ,,, | Performed by: FAMILY MEDICINE

## 2025-04-03 PROCEDURE — 3080F DIAST BP >= 90 MM HG: CPT | Mod: ,,, | Performed by: FAMILY MEDICINE

## 2025-04-03 PROCEDURE — 3044F HG A1C LEVEL LT 7.0%: CPT | Mod: ,,, | Performed by: FAMILY MEDICINE

## 2025-04-03 PROCEDURE — 99213 OFFICE O/P EST LOW 20 MIN: CPT | Mod: GC,,, | Performed by: FAMILY MEDICINE

## 2025-04-03 PROCEDURE — 3075F SYST BP GE 130 - 139MM HG: CPT | Mod: ,,, | Performed by: FAMILY MEDICINE

## 2025-04-03 RX ORDER — SILDENAFIL CITRATE 20 MG/1
TABLET ORAL
Qty: 60 TABLET | Refills: 1 | Status: SHIPPED | OUTPATIENT
Start: 2025-04-03

## 2025-04-03 RX ORDER — AMLODIPINE BESYLATE 5 MG/1
5 TABLET ORAL DAILY
Qty: 30 TABLET | Refills: 2 | Status: SHIPPED | OUTPATIENT
Start: 2025-04-03 | End: 2025-07-07

## 2025-04-22 NOTE — PROGRESS NOTES
Subjective:       Patient ID: Eugenio Judge is a 59 y.o. male.    Chief Complaint: Medication Refill (Room 4 medication refill )    58 yo established pt here for refills. Pt takes amlodipine 5 mg for HTN. He states that the medicine works well and his blood pressure readings at home have been in range. 132/93 today. Pt states its usually lower typically. Will continue to monitor home BP measurements.  Pt also takes sildenafil 20 mg for ED. Pt has no complaints with the medicine. No lightheadedness, palpitations, syncope, orthostasis when taking it either.    Pt was ordered a colonoscopy last visit but some how or another pts order was switched to FOBT home test. Perhaps the pt changed it thinking it would substitute the colonoscopy. Pt is unsure how it happened. Nonetheless, pt needs colonoscopy and reordering one today.        Current Medications[1]    Review of patient's allergies indicates:   Allergen Reactions    Covid-19 vac, bv (pfizer)(pf) Swelling, Other (See Comments) and Blisters     [id number] and [id number]  Fever    Grass pollen      Note: - [employer]rees[employer] 06/12/2018    Adhesive tape-silicones Rash       Past Medical History:   Diagnosis Date    H/O colectomy 04/02/2015    Dr. Crump    Kidney stone        Past Surgical History:   Procedure Laterality Date    APPENDECTOMY      COLON SURGERY      COLON SURGERY      ELBOW SURGERY      KNEE SURGERY      SHOULDER SURGERY      SHOULDER SURGERY         Family History   Problem Relation Name Age of Onset    Diabetes Father      Heart disease Father      Stroke Father      Hypertension Mother      Diabetes Sister         Social History[2]    Review of Systems   Constitutional:  Negative for chills and fever.   Respiratory:  Negative for shortness of breath.    Cardiovascular:  Negative for chest pain and palpitations.   Gastrointestinal:  Negative for blood in stool, nausea and vomiting.   Neurological:  Negative for dizziness, syncope and light-headedness.          "  Objective:      Vitals:    04/03/25 1457   BP: (!) 132/93   BP Location: Left arm   Patient Position: Sitting   Pulse: 65   Resp: 18   Temp: 98 °F (36.7 °C)   TempSrc: Oral   SpO2: 96%   Weight: 81.2 kg (179 lb)   Height: 5' 6" (1.676 m)     Physical Exam  Vitals and nursing note reviewed.   Constitutional:       General: He is not in acute distress.     Appearance: Normal appearance. He is not ill-appearing.   HENT:      Head: Normocephalic and atraumatic.   Cardiovascular:      Rate and Rhythm: Normal rate and regular rhythm.      Pulses: Normal pulses.      Heart sounds: Normal heart sounds. No murmur heard.     No friction rub. No gallop.   Pulmonary:      Effort: Pulmonary effort is normal. No respiratory distress.      Breath sounds: Normal breath sounds.   Abdominal:      General: Bowel sounds are normal.      Tenderness: There is no abdominal tenderness.   Skin:     General: Skin is warm and dry.   Neurological:      General: No focal deficit present.      Mental Status: He is alert and oriented to person, place, and time.   Psychiatric:         Mood and Affect: Mood normal.         Behavior: Behavior normal.           Lab Results   Component Value Date    WBC 6.86 01/02/2025    HGB 14.5 01/02/2025    HCT 41.8 01/02/2025     01/02/2025    CHOL 202 (H) 05/25/2021    TRIG 219 (H) 05/25/2021    HDL 39 (L) 05/25/2021    ALT 40 01/02/2025    AST 39 (H) 01/02/2025     01/02/2025    K 3.9 01/02/2025     01/02/2025    CREATININE 0.92 01/02/2025    BUN 11 01/02/2025    CO2 27 01/02/2025    PSA 1.232 01/02/2025    HGBA1C 5.3 01/02/2025      Assessment:       1. Routine health maintenance    2. Erectile dysfunction, unspecified erectile dysfunction type    3. Hypertension, unspecified type        Plan:         Problem List Items Addressed This Visit          Cardiac/Vascular    Hypertension    Pt states that he checks his BP at home and it is often less than 130 systolic and low 80s on " diastolic.  132/93 in office today.  Will continue 5 mg amlodipine and routinely monitor BP.            Renal/    Erectile dysfunction    Refilling pt's 20 mg sildenafil. No side effects from medicine.         Relevant Medications    sildenafil (REVATIO) 20 mg Tab       Other    Routine health maintenance - Primary    Reordering colonoscopy. Discussed with pt there is no substitute for it. Pt verbally acknowledged.         Relevant Orders    Colonoscopy         Follow up in about 6 months (around 10/3/2025).    Ezra Schedule, DO     Instructed patient that if symptoms fail to improve or worsen patient should seek immediate medical attention or report to the nearest emergency department. Patient expressed verbal agreement and understanding to this plan of care.          [1]   Current Outpatient Medications:     amLODIPine (NORVASC) 5 MG tablet, Take 1 tablet (5 mg total) by mouth once daily. for blood pressure, Disp: 30 tablet, Rfl: 2    amoxicillin (AMOXIL) 875 MG tablet, Take 1 tablet (875 mg total) by mouth every 12 (twelve) hours. (Patient not taking: Reported on 1/2/2025), Disp: 20 tablet, Rfl: 0    sildenafil (REVATIO) 20 mg Tab, TAKE 2-5 TABLETS BY MOUTH ONE (1) HOUR PRIOR TO INTERCOURSE, Disp: 60 tablet, Rfl: 1  [2]   Social History  Tobacco Use    Smoking status: Never    Smokeless tobacco: Never   Substance Use Topics    Alcohol use: Not Currently    Drug use: Never

## 2025-04-25 ENCOUNTER — PATIENT OUTREACH (OUTPATIENT)
Facility: HOSPITAL | Age: 60
End: 2025-04-25
Payer: COMMERCIAL

## 2025-04-25 ENCOUNTER — PATIENT MESSAGE (OUTPATIENT)
Dept: ADMINISTRATIVE | Facility: HOSPITAL | Age: 60
End: 2025-04-25

## 2025-04-25 DIAGNOSIS — Z12.11 SCREENING FOR COLORECTAL CANCER: Primary | ICD-10-CM

## 2025-04-25 DIAGNOSIS — Z12.12 SCREENING FOR COLORECTAL CANCER: Primary | ICD-10-CM

## 2025-04-25 NOTE — PROGRESS NOTES
Population Health Chart Review & Patient Outreach Details      Further Action Needed If Patient Returns Outreach:        Health Maintenance Due   Topic Date Due    Hepatitis C Screening  Never done    HIV Screening  Never done    Colorectal Cancer Screening  Never done    Shingles Vaccine (1 of 2) Never done    Pneumococcal Vaccines (Age 50+) (1 of 1 - PCV) Never done    Influenza Vaccine (1) 2024    COVID-19 Vaccine (3 - -25 season) 2024          Updates Requested / Reviewed:     [x]  Care Everywhere    [x]     []  External Sources ([facility], [facility], [facility], etc.)    [] [facility]   [] [facility]   [] Other:    [x]  Care Team Updated   []  Removed  or Duplicate Orders   []  Immunization Reconciliation Completed / Queried    [] [place]   [] [place]   [] [place]   [] [place]      Health Maintenance Topics Addressed and Outreach Outcomes / Actions Taken:             Breast Cancer Screening []  Mammogram Order Placed    []  Mammogram Screening Scheduled    []  External Records Requested & Care Team Updated if Applicable    []  External Records Uploaded & Care Team Updated if Applicable    []  Pt Declined Scheduling Mammogram    []  Pt Will Schedule with External Provider / Order Routed & Care Team Updated if Applicable              Cervical Cancer Screening []  Pap Smear Scheduled in Primary Care or OBGYN    []  External Records Requested & Care Team Updated if Applicable       []  External Records Uploaded, Care Team Updated, & History Updated if Applicable    []  Patient Declined Scheduling Pap Smear    []  Patient Will Schedule with External Provider & Care Team Updated if Applicable                  Colorectal Cancer Screening [x]  Colonoscopy Case Request / Referral / Home Test Order Placed    []  External Records Requested & Care Team Updated if Applicable    []  External Records Uploaded, Care Team Updated, & History Updated if Applicable    []  Patient Declined Completing Colon Cancer  Screening    []  Patient Will Schedule with External Provider & Care Team Updated if Applicable    []  Fit Kit Mailed (add the SmartPhrase under additional notes)    []  Reminded Patient to Complete Home Test                Diabetic Eye Exam []  Eye Exam Screening Order Placed    []  Eye Camera Scheduled or Optometry/Ophthalmology Referral Placed    []  External Records Requested & Care Team Updated if Applicable    []  External Records Uploaded, Care Team Updated, & History Updated if Applicable    []  Patient Declined Scheduling Eye Exam    []  Patient Will Schedule with External Provider & Care Team Updated if Applicable             Blood Pressure Control []  Primary Care Follow Up Visit Scheduled     []  Remote Blood Pressure Reading Captured    []  Patient Declined Remote Reading or Scheduling Appt - Escalated to PCP    []  Patient Will Call Back or Send Portal Message with Reading                 HbA1c & Other Labs []  Overdue Lab(s) Ordered    []  Overdue Lab(s) Scheduled    []  External Records Uploaded & Care Team Updated if Applicable    []  Primary Care Follow Up Visit Scheduled     []  Reminded Patient to Complete A1c Home Test    []  Patient Declined Scheduling Labs or Will Call Back to Schedule    []  Patient Will Schedule with External Provider / Order Routed, & Care Team Updated if Applicable           Primary Care Appointment []  Primary Care Appt Scheduled    []  Patient Declined Scheduling or Will Call Back to Schedule    []  Pt Established with External Provider, Updated Care Team, & Informed Pt to Notify Payor if Applicable           Medication Adherence /    Statin Use []  Primary Care Appointment Scheduled    []  Patient Reminded to  Prescription    []  Patient Declined, Provider Notified if Needed    []  Sent Provider Message to Review to Evaluate Pt for Statin, Add Exclusion Dx Codes, Document   Exclusion in Problem List, Change Statin Intensity Level to Moderate or High Intensity if  Applicable                Osteoporosis Screening []  Dexa Order Placed    []  Dexa Appointment Scheduled    []  External Records Requested & Care Team Updated    []  External Records Uploaded, Care Team Updated, & History Updated if Applicable    []  Patient Declined Scheduling Dexa or Will Call Back to Schedule    []  Patient Will Schedule with External Provider / Order Routed & Care Team Updated if Applicable       Additional Notes:

## 2025-04-29 PROBLEM — N52.9 ERECTILE DYSFUNCTION: Status: ACTIVE | Noted: 2025-04-29

## 2025-04-29 NOTE — ASSESSMENT & PLAN NOTE
Pt states that he checks his BP at home and it is often less than 130 systolic and low 80s on diastolic.  132/93 in office today.  Will continue 5 mg amlodipine and routinely monitor BP.

## 2025-04-29 NOTE — ASSESSMENT & PLAN NOTE
Reordering colonoscopy. Discussed with pt there is no substitute for it. Pt verbally acknowledged.

## 2025-07-03 RX ORDER — AMLODIPINE BESYLATE 5 MG/1
5 TABLET ORAL DAILY
Qty: 30 TABLET | Refills: 2 | Status: SHIPPED | OUTPATIENT
Start: 2025-07-03 | End: 2025-10-01

## 2025-08-05 ENCOUNTER — PATIENT MESSAGE (OUTPATIENT)
Facility: HOSPITAL | Age: 60
End: 2025-08-05
Payer: COMMERCIAL